# Patient Record
Sex: MALE | Race: WHITE | NOT HISPANIC OR LATINO | Employment: FULL TIME | URBAN - NONMETROPOLITAN AREA
[De-identification: names, ages, dates, MRNs, and addresses within clinical notes are randomized per-mention and may not be internally consistent; named-entity substitution may affect disease eponyms.]

---

## 2020-02-14 ENCOUNTER — OFFICE VISIT (OUTPATIENT)
Dept: CARDIOLOGY CLINIC | Facility: CLINIC | Age: 55
End: 2020-02-14
Payer: COMMERCIAL

## 2020-02-14 VITALS
SYSTOLIC BLOOD PRESSURE: 132 MMHG | DIASTOLIC BLOOD PRESSURE: 82 MMHG | WEIGHT: 216.05 LBS | BODY MASS INDEX: 28.63 KG/M2 | HEIGHT: 73 IN | HEART RATE: 74 BPM

## 2020-02-14 DIAGNOSIS — I10 ESSENTIAL HYPERTENSION: ICD-10-CM

## 2020-02-14 DIAGNOSIS — R06.00 DYSPNEA ON EXERTION: ICD-10-CM

## 2020-02-14 DIAGNOSIS — R55 NEAR SYNCOPE: Primary | ICD-10-CM

## 2020-02-14 DIAGNOSIS — E78.2 MIXED HYPERLIPIDEMIA: ICD-10-CM

## 2020-02-14 PROBLEM — E78.5 HYPERLIPEMIA: Status: ACTIVE | Noted: 2020-02-14

## 2020-02-14 PROBLEM — R06.09 DYSPNEA ON EXERTION: Status: ACTIVE | Noted: 2020-02-14

## 2020-02-14 PROCEDURE — 99204 OFFICE O/P NEW MOD 45 MIN: CPT | Performed by: INTERNAL MEDICINE

## 2020-02-14 RX ORDER — SILDENAFIL 100 MG/1
100 TABLET, FILM COATED ORAL
COMMUNITY
Start: 2016-01-04

## 2020-02-14 RX ORDER — NEBIVOLOL 2.5 MG/1
2.5 TABLET ORAL DAILY
COMMUNITY
Start: 2016-01-26

## 2020-02-14 RX ORDER — ATOVAQUONE AND PROGUANIL HYDROCHLORIDE 250; 100 MG/1; MG/1
1 TABLET, FILM COATED ORAL
COMMUNITY
Start: 2018-06-28 | End: 2020-02-14 | Stop reason: CLARIF

## 2020-02-14 RX ORDER — ASPIRIN 81 MG/1
81 TABLET ORAL DAILY
COMMUNITY

## 2020-02-14 RX ORDER — SIMVASTATIN 40 MG
40 TABLET ORAL
COMMUNITY
Start: 2016-02-19

## 2020-02-14 NOTE — PATIENT INSTRUCTIONS
EKG today shows normal sinus rhythm appears normal   I would recommend echocardiogram to assess heart structure and function  I would recommend exercise stress echocardiogram to assess heart rate and blood pressure response to exercise as well as to rule out any significant rhythm issues that could be contributing to near syncope

## 2020-02-14 NOTE — PROGRESS NOTES
Cardiology Office Visit    Navya Milian  [id number]  1965     [facility] [place]  [address] [id number] [address]      Dear Adolfo Wesley DO,    I had the pleasure of seeing your patient at our [address] [place] [address] office today 2/14/2020  As you know he is a pleasant 53y o  year old male with a medical history as described below  Reason for office visit: Evaluation of shortness of breath and near syncope  1  Near syncope  Assessment & Plan:  Patient reports 2 episodes of near syncope in the setting of dyspnea on exertion  I have recommended echocardiogram and stress echocardiogram to assess heart structure and function as well as to assess heart rate and blood pressure response to exercise  Orders:  -     Echo complete with contrast if indicated; Future; Expected date: 02/14/2020  -     Echo stress test w contrast if indicated; Future; Expected date: 02/14/2020  -     POCT ECG    2  Dyspnea on exertion  Assessment & Plan: Onset of dyspnea on exertion over the last month  Two episodes of near syncope that have occurred with exertion  Risk factors for underlying coronary disease include hypertension and hyperlipidemia  Unclear if dyspnea could be an anginal equivalent  Exercise stress echocardiogram and echocardiogram as above  Orders:  -     Echo complete with contrast if indicated; Future; Expected date: 02/14/2020  -     Echo stress test w contrast if indicated; Future; Expected date: 02/14/2020    3  Essential hypertension  Assessment & Plan:  Continue current blood pressure medication without change  Blood pressure controlled on my personal recheck  Orders:  -     Echo complete with contrast if indicated; Future; Expected date: 02/14/2020  -     Echo stress test w contrast if indicated; Future; Expected date: 02/14/2020  -     POCT ECG    4   Mixed hyperlipidemia  Assessment & Plan:  Patient is on simvastatin 40 mg daily  Will try to obtain most recent lab work including lipid panel  Orders:  -     Echo complete with contrast if indicated; Future; Expected date: 02/14/2020  -     Echo stress test w contrast if indicated; Future; Expected date: 02/14/2020           HPI   History of Present Illness:     Patient presents for evaluation of shortness of breath/dyspnea on exertion and near syncope  Patient has a history of hypertension and hyperlipidemia which are being treated  Patient reports that he has always been very active and walks often and takes weights along with him when walking  He tells me that over the last month he seems to have developed worsening dyspnea on exertion especially when climbing the hill back to his house  He reports 2 episodes of near syncope in the setting of feeling winded and a sense of "something bad was about to happen'  He does tell me that he had been trying to stop his Bystolic and was taking it every other day during these events  He denies any overt chest pain  He does say that he felt very anxious after the two near syncopal episodes  He denies any palpitations  He does report increased fatigue  He does consume a fair amount of alcohol a few nights a week       Patient Active Problem List   Diagnosis    Hypertension    Hyperlipemia    Near syncope    Dyspnea on exertion     Past Medical History:   Diagnosis Date    Hyperlipemia     Hypertension     Melanoma ([facility] [address] )      Social History     Socioeconomic History    Marital status: /Civil Union     Spouse name: Not on file    Number of children: 2    Years of education: Not on file    Highest education level: Not on file   Occupational History    Occupation: Financial consulting   Social Needs    Financial resource strain: Not on file    Food insecurity:     Worry: Not on file     Inability: Not on file    Transportation needs:     Medical: Not on file     Non-medical: Not on file   Tobacco Use    Smoking status: Never Smoker    Smokeless tobacco: Never Used   Substance and Sexual Activity    Alcohol use: Yes     Frequency: 2-3 times a week     Drinks per session: 7 to 9    Drug use: Never    Sexual activity: Not on file     Comment: Defer   Lifestyle    Physical activity:     Days per week: Not on file     Minutes per session: Not on file    Stress: Not on file   Relationships    Social connections:     Talks on phone: Not on file     Gets together: Not on file     Attends Mosque service: Not on file     Active member of club or organization: Not on file     Attends meetings of clubs or organizations: Not on file     Relationship status: Not on file    Intimate partner violence:     Fear of current or ex partner: Not on file     Emotionally abused: Not on file     Physically abused: Not on file     Forced sexual activity: Not on file   Other Topics Concern    Not on file   Social History Narrative    Not on file      Family History   Problem Relation Age of Onset    Pancreatic cancer Mother     Melanoma Father     No Known Problems Sister      Past Surgical History:   Procedure Laterality Date    ELBOW SURGERY Right     KNEE SURGERY Right     SKIN TAG REMOVAL      VASECTOMY         Current Outpatient Medications:     aspirin (ECOTRIN LOW STRENGTH) 81 mg EC tablet, Take 81 mg by mouth daily, Disp: , Rfl:     nebivolol (Bystolic) 2 5 mg tablet, Take 2 5 mg by mouth daily, Disp: , Rfl:     sildenafil (Viagra) 100 mg tablet, Take 100 mg by mouth, Disp: , Rfl:     simvastatin (ZOCOR) 40 mg tablet, Take 40 mg by mouth, Disp: , Rfl:   No Known Allergies        EC2020: Normal sinus rhythm  Normal ECG  Review of Systems:    Review of Systems   Constitutional: Positive for fatigue  Negative for activity change and appetite change  HENT: Negative for congestion, hearing loss, tinnitus and trouble swallowing  Eyes: Negative for visual disturbance     Respiratory: Positive for shortness of breath (Dyspnea)  Negative for cough, chest tightness and wheezing  Cardiovascular: Negative for chest pain, palpitations and leg swelling  Gastrointestinal: Negative for abdominal distention, abdominal pain, nausea and vomiting  Genitourinary: Negative for difficulty urinating  Musculoskeletal: Negative for arthralgias  Skin: Negative for rash  Neurological: Positive for light-headedness  Negative for dizziness and syncope  Hematological: Does not bruise/bleed easily  Psychiatric/Behavioral: Negative for confusion  The patient is not nervous/anxious  All other systems reviewed and are negative  Vitals:    02/14/20 1032 02/14/20 1109   BP: 142/88 132/82   BP Location: Left arm    Patient Position: Sitting    Cuff Size: Standard    Pulse: 74    Weight: 98 kg (216 lb 0 8 oz)    Height: 6' 1" (1 854 m)      Vitals:    02/14/20 1032   Weight: 98 kg (216 lb 0 8 oz)     Height: 6' 1" (185 4 cm)     Physical Exam   Constitutional: He is oriented to person, place, and time  He appears well-developed and well-nourished  HENT:   Head: Normocephalic and atraumatic  Eyes: Pupils are equal, round, and reactive to light  Conjunctivae are normal    Neck: Normal range of motion  No JVD present  Cardiovascular: Normal rate, regular rhythm and normal heart sounds  Exam reveals no gallop and no friction rub  No murmur heard  Pulmonary/Chest: Effort normal and breath sounds normal    Abdominal: Soft  Bowel sounds are normal    Musculoskeletal: He exhibits no edema  Neurological: He is alert and oriented to person, place, and time  Skin: Skin is warm and dry  Psychiatric: He has a normal mood and affect  His behavior is normal    Vitals reviewed

## 2020-02-15 PROCEDURE — 93000 ELECTROCARDIOGRAM COMPLETE: CPT | Performed by: INTERNAL MEDICINE

## 2020-02-15 NOTE — ASSESSMENT & PLAN NOTE
Onset of dyspnea on exertion over the last month  Two episodes of near syncope that have occurred with exertion  Risk factors for underlying coronary disease include hypertension and hyperlipidemia  Unclear if dyspnea could be an anginal equivalent  Exercise stress echocardiogram and echocardiogram as above  no

## 2020-02-15 NOTE — ASSESSMENT & PLAN NOTE
Continue current blood pressure medication without change  Blood pressure controlled on my personal recheck

## 2020-02-15 NOTE — ASSESSMENT & PLAN NOTE
Patient is on simvastatin 40 mg daily  Will try to obtain most recent lab work including lipid panel

## 2020-02-15 NOTE — ASSESSMENT & PLAN NOTE
Patient reports 2 episodes of near syncope in the setting of dyspnea on exertion  I have recommended echocardiogram and stress echocardiogram to assess heart structure and function as well as to assess heart rate and blood pressure response to exercise

## 2020-02-28 DIAGNOSIS — R06.00 DYSPNEA ON EXERTION: ICD-10-CM

## 2020-02-28 DIAGNOSIS — R55 NEAR SYNCOPE: Primary | ICD-10-CM

## 2020-02-28 DIAGNOSIS — I10 ESSENTIAL HYPERTENSION: ICD-10-CM

## 2020-02-28 DIAGNOSIS — E78.2 MIXED HYPERLIPIDEMIA: ICD-10-CM

## 2020-03-03 ENCOUNTER — HOSPITAL ENCOUNTER (OUTPATIENT)
Dept: NON INVASIVE DIAGNOSTICS | Facility: CLINIC | Age: 55
Discharge: HOME | End: 2020-03-03
Payer: COMMERCIAL

## 2020-03-03 DIAGNOSIS — R55 NEAR SYNCOPE: ICD-10-CM

## 2020-03-03 DIAGNOSIS — I10 ESSENTIAL HYPERTENSION: ICD-10-CM

## 2020-03-03 DIAGNOSIS — R06.00 DYSPNEA ON EXERTION: ICD-10-CM

## 2020-03-03 DIAGNOSIS — E78.2 MIXED HYPERLIPIDEMIA: ICD-10-CM

## 2020-03-03 PROCEDURE — 93016 CV STRESS TEST SUPVJ ONLY: CPT | Performed by: INTERNAL MEDICINE

## 2020-03-03 PROCEDURE — 93017 CV STRESS TEST TRACING ONLY: CPT

## 2020-03-03 PROCEDURE — 93018 CV STRESS TEST I&R ONLY: CPT | Performed by: INTERNAL MEDICINE

## 2020-03-04 LAB
ARRHY DURING EX: NORMAL
CHEST PAIN STATEMENT: NORMAL
MAX DIASTOLIC BP: 82 MMHG
MAX HEART RATE: 148 BPM
MAX PREDICTED HEART RATE: 165 BPM
MAX. SYSTOLIC BP: 166 MMHG
PROTOCOL NAME: NORMAL
REASON FOR TERMINATION: NORMAL
TARGET HR FORMULA: NORMAL
TEST INDICATION: NORMAL
TIME IN EXERCISE PHASE: NORMAL

## 2020-03-06 ENCOUNTER — HOSPITAL ENCOUNTER (OUTPATIENT)
Dept: NON INVASIVE DIAGNOSTICS | Facility: HOSPITAL | Age: 55
Discharge: HOME | End: 2020-03-06
Attending: INTERNAL MEDICINE
Payer: COMMERCIAL

## 2020-03-06 DIAGNOSIS — E78.2 MIXED HYPERLIPIDEMIA: ICD-10-CM

## 2020-03-06 DIAGNOSIS — R06.00 DYSPNEA ON EXERTION: ICD-10-CM

## 2020-03-06 DIAGNOSIS — R55 NEAR SYNCOPE: ICD-10-CM

## 2020-03-06 DIAGNOSIS — I10 ESSENTIAL HYPERTENSION: ICD-10-CM

## 2020-03-06 PROCEDURE — 93306 TTE W/DOPPLER COMPLETE: CPT | Performed by: INTERNAL MEDICINE

## 2020-03-06 PROCEDURE — 93306 TTE W/DOPPLER COMPLETE: CPT

## 2020-03-12 ENCOUNTER — OFFICE VISIT (OUTPATIENT)
Dept: CARDIOLOGY CLINIC | Facility: CLINIC | Age: 55
End: 2020-03-12
Payer: COMMERCIAL

## 2020-03-12 VITALS
HEART RATE: 87 BPM | OXYGEN SATURATION: 98 % | SYSTOLIC BLOOD PRESSURE: 110 MMHG | BODY MASS INDEX: 28.93 KG/M2 | HEIGHT: 73 IN | DIASTOLIC BLOOD PRESSURE: 78 MMHG | WEIGHT: 218.26 LBS

## 2020-03-12 DIAGNOSIS — R06.00 DYSPNEA ON EXERTION: ICD-10-CM

## 2020-03-12 DIAGNOSIS — R55 NEAR SYNCOPE: Primary | ICD-10-CM

## 2020-03-12 DIAGNOSIS — I10 ESSENTIAL HYPERTENSION: ICD-10-CM

## 2020-03-12 DIAGNOSIS — E78.2 MIXED HYPERLIPIDEMIA: ICD-10-CM

## 2020-03-12 PROCEDURE — 99213 OFFICE O/P EST LOW 20 MIN: CPT | Performed by: INTERNAL MEDICINE

## 2020-03-12 NOTE — ASSESSMENT & PLAN NOTE
Patient previously reported 2 episodes of near-syncope in the setting of dyspnea on exertion  I had recommended echocardiogram as well as exercise stress test   Echocardiogram was within normal limits as was exercise stress test   I have recommended that he continue to monitor his symptoms  If recurrent episodes we could certainly consider ZIO monitoring

## 2020-03-12 NOTE — ASSESSMENT & PLAN NOTE
Patient with onset of dyspnea on exertion  Two episodes of near syncope that occurred with exertion  Echocardiogram and stress test normal    I have asked the patient to continue to monitor for recurrent symptoms  Unclear if this could be an anginal equivalent  I have recommended coronary calcium scoring for further risk stratification in combination with testing as outlined above

## 2020-03-12 NOTE — PROGRESS NOTES
Cardiology Office Visit    Beryle Dublin  [id number]  1965     [facility] [place]  [id number] [facility] [address] [place] [address]      Dear Bacilio Daniels DO,    I had the pleasure of seeing your patient at our [address] [place] [address] office today 3/12/2020  As you know he is a pleasant 53y o  year old male with a medical history as described below  Reason for office visit:  Follow-up near-syncope, dyspnea on exertion, hypertension and hyperlipidemia  1  Near syncope  Assessment & Plan:  Patient previously reported 2 episodes of near-syncope in the setting of dyspnea on exertion  I had recommended echocardiogram as well as exercise stress test   Echocardiogram was within normal limits as was exercise stress test   I have recommended that he continue to monitor his symptoms  If recurrent episodes we could certainly consider ZIO monitoring  Orders:  -     CT coronary calcium score; Future; Expected date: 03/12/2020    2  Dyspnea on exertion  Assessment & Plan:  Patient with onset of dyspnea on exertion  Two episodes of near syncope that occurred with exertion  Echocardiogram and stress test normal    I have asked the patient to continue to monitor for recurrent symptoms  Unclear if this could be an anginal equivalent  I have recommended coronary calcium scoring for further risk stratification in combination with testing as outlined above  Orders:  -     CT coronary calcium score; Future; Expected date: 03/12/2020    3  Essential hypertension  Assessment & Plan:  Blood pressure is well controlled on Bystolic 2 5 mg     Orders:  -     CT coronary calcium score; Future; Expected date: 03/12/2020    4  Mixed hyperlipidemia  Assessment & Plan:  Patient is on simvastatin 40 mg daily  Will try to obtain most recent lab work including lipid panel  Orders:  -     CT coronary calcium score;  Future; Expected date: 03/12/2020           HPI     Patient presents for follow-up of near-syncope, dyspnea on exertion, hypertension and hyperlipidemia  I had initially met the patient 02/14/2020 when he presented for evaluation  About a month prior to his office visit he had noted that he was developing some dyspnea on exertion especially when climbing the held back up to his house  He also reported 2 episodes of near-syncope in the setting of feeling winded as well as a sense of something bad was about to happen  He did tell me at that time he had been trying to stop his Bystolic and was taking it every other day at the time these events occurred  He denied any overt chest pain  He does admit that he felt very anxious after the 2 episodes of near-syncope  I had recommended echocardiogram to assess heart structure and function as well as an exercise stress test to evaluate heart rate and blood pressure response to exercise and to rule out underlying ischemia  Echocardiogram 03/06/2020 was completely normal with an ejection fraction of 60%  Normal diastolic function and no significant valve abnormalities  Exercise stress test 03/03/2020 was normal   The patient exercised 10 minutes  No chest pain was noted  Stress EKG was negative for ischemia  Patient returns to the office today to discuss results of testing  We reviewed the results of his tests which were all within normal limits  He denies any recurrent symptoms  He denies any recurrent near-syncope  I also discussed with him that I care more about his symptoms than I do the results of the testing itself  We also discussed proceeding with coronary artery calcium scoring which he is interested in doing        Patient Active Problem List   Diagnosis    Hypertension    Hyperlipemia    Near syncope    Dyspnea on exertion     Past Medical History:   Diagnosis Date    Hyperlipemia     Hypertension     Melanoma ([address] )      Social History     Socioeconomic History    Marital status: /Civil Union Spouse name: Not on file    Number of children: 2    Years of education: Not on file    Highest education level: Not on file   Occupational History    Occupation: Financial consulting   Social Needs    Financial resource strain: Not on file    Food insecurity:     Worry: Not on file     Inability: Not on file    Transportation needs:     Medical: Not on file     Non-medical: Not on file   Tobacco Use    Smoking status: Never Smoker    Smokeless tobacco: Never Used   Substance and Sexual Activity    Alcohol use: Yes     Frequency: 2-3 times a week     Drinks per session: 7 to 9    Drug use: Never    Sexual activity: Not on file     Comment: Defer   Lifestyle    Physical activity:     Days per week: Not on file     Minutes per session: Not on file    Stress: Not on file   Relationships    Social connections:     Talks on phone: Not on file     Gets together: Not on file     Attends Yazidi service: Not on file     Active member of club or organization: Not on file     Attends meetings of clubs or organizations: Not on file     Relationship status: Not on file    Intimate partner violence:     Fear of current or ex partner: Not on file     Emotionally abused: Not on file     Physically abused: Not on file     Forced sexual activity: Not on file   Other Topics Concern    Not on file   Social History Narrative    Not on file      Family History   Problem Relation Age of Onset    Pancreatic cancer Mother     Melanoma Father     No Known Problems Sister      Past Surgical History:   Procedure Laterality Date    ELBOW SURGERY Right     KNEE SURGERY Right     SKIN TAG REMOVAL      VASECTOMY  2003       Current Outpatient Medications:     aspirin (ECOTRIN LOW STRENGTH) 81 mg EC tablet, Take 81 mg by mouth daily, Disp: , Rfl:     nebivolol (Bystolic) 2 5 mg tablet, Take 2 5 mg by mouth daily, Disp: , Rfl:     sildenafil (Viagra) 100 mg tablet, Take 100 mg by mouth, Disp: , Rfl:    simvastatin (ZOCOR) 40 mg tablet, Take 40 mg by mouth, Disp: , Rfl:   No Known Allergies      EC2020:  Normal sinus rhythm  Normal EKG  Review of Systems:    Review of Systems   Constitutional: Negative for activity change, appetite change and fatigue  HENT: Negative for congestion, hearing loss, tinnitus and trouble swallowing  Eyes: Negative for visual disturbance  Respiratory: Negative for cough, chest tightness, shortness of breath and wheezing  Cardiovascular: Negative for chest pain, palpitations and leg swelling  Gastrointestinal: Negative for abdominal distention, abdominal pain, nausea and vomiting  Genitourinary: Negative for difficulty urinating  Musculoskeletal: Negative for arthralgias  Skin: Negative for rash  Neurological: Negative for dizziness, syncope and light-headedness  Hematological: Does not bruise/bleed easily  Psychiatric/Behavioral: Negative for confusion  The patient is not nervous/anxious  All other systems reviewed and are negative  Vitals:    20 0930   BP: 110/78   BP Location: Left arm   Patient Position: Sitting   Pulse: 87   SpO2: 98%   Weight: 99 kg (218 lb 4 1 oz)   Height: 6' 1" (1 854 m)     Vitals:    20 0930   Weight: 99 kg (218 lb 4 1 oz)     Height: 6' 1" (185 4 cm)     Physical Exam   Constitutional: He is oriented to person, place, and time  He appears well-developed and well-nourished  HENT:   Head: Normocephalic and atraumatic  Eyes: Pupils are equal, round, and reactive to light  Conjunctivae are normal    Neck: Normal range of motion  No JVD present  Cardiovascular: Normal rate, regular rhythm and normal heart sounds  Exam reveals no gallop and no friction rub  No murmur heard  Pulmonary/Chest: Effort normal and breath sounds normal    Abdominal: Soft  Bowel sounds are normal    Musculoskeletal: He exhibits no edema  Neurological: He is alert and oriented to person, place, and time     Skin: Skin is warm and dry  Psychiatric: He has a normal mood and affect  His behavior is normal    Vitals reviewed

## 2020-03-12 NOTE — PATIENT INSTRUCTIONS
Exercise stress test showed no evidence of any significant blockages in the heart  Echocardiogram showed normal heart size and function  I would recommend coronary artery calcium scoring as discussed for further risk stratification    Continue monitoring symptoms as these are more important than the actual results of the test

## 2022-08-09 ENCOUNTER — TELEPHONE (OUTPATIENT)
Dept: UROLOGY | Facility: CLINIC | Age: 57
End: 2022-08-09

## 2022-08-09 NOTE — TELEPHONE ENCOUNTER
Email received from Dr Bill House to schedule patient for a visit with him  Called and left message for return call to schedule appt     When patient calls back please offer 9/8/22 at 8am, I did tentatively schedule this

## 2022-09-07 PROBLEM — Z12.5 PROSTATE CANCER SCREENING ENCOUNTER, OPTIONS AND RISKS DISCUSSED: Status: ACTIVE | Noted: 2022-09-07

## 2022-09-07 PROBLEM — N40.1 BENIGN LOCALIZED PROSTATIC HYPERPLASIA WITH LOWER URINARY TRACT SYMPTOMS (LUTS): Status: ACTIVE | Noted: 2022-09-07

## 2022-09-07 NOTE — PROGRESS NOTES
Problem List Items Addressed This Visit        Cardiovascular and Mediastinum    Erectile dysfunction due to arterial insufficiency       Genitourinary    Benign localized prostatic hyperplasia with lower urinary tract symptoms (LUTS)    Relevant Orders    Urinalysis with microscopic       Other    Prostate cancer screening encounter, options and risks discussed - Primary    Relevant Orders    PSA Total, Diagnostic    Low libido    Relevant Orders    Testosterone, free, total            Discussion:    Corinne Bills and I had a productive initial consultation today  He does have some enlargement of his prostate on digital rectal examination at 35 grams, he is otherwise voiding well and we can survey his lower urinary tract symptoms at this time  He does have gout, does not typically get many attacks, he is taking some medications for blood pressure and cholesterol as well  He does use sildenafil for erectile dysfunction which is working for him most of the time  He is , he endorses no new relationship stressors and is feeling safe at home  He works as a   He is otherwise physically active and has a normal body mass index  His main complaint today is decreased sexual function and low libido and lack of energy consistent with symptoms of low testosterone  His measure testosterone is 336 which is lower than I would expect for a man his age and of his lifestyle given that he keeps active and does not have typical modifiable risk factors at the current time  PSA is 0 43  I have ordered a repeat testosterone testing, free and total   Once I have these lab results back I will order him for testosterone replacement therapy  He does understand that he will require periodic measuring of his hematocrit as well as PSA and testosterone levels after we initiate this therapy  He may or may not be interested in injection therapy if he does not tolerate the gel therapy going forward      He will see me back in 3 months  I will order his testosterone replacement therapy once I have his further lab work  From a prostate cancer screening perspective he is low risk at this time    Assessment and plan:       Please see problem oriented charting for the assessment plan of today's urological complaints      Digna Daniel MD      Chief Complaint   As above      History of Present Illness     Rebecca Cota is a 61 y o  man, new patient, presents with complaints of low energy and low libido  He works as an , otherwise keep healthy and is physically active  He has no family history of prostate cancer or kidney cancer  Does have a family history of pancreatic cancer, however  No aggravating alleviating factors  He is , no troubles with his relationship, feels safe at home  No smoking, no heavy alcohol intake  No other modifiable risk factors with regard to his current complaints  He has symptoms of hypogonadism and low testosterone  The following portions of the patient's history were reviewed and updated as appropriate: allergies, current medications, past family history, past medical history, past social history, past surgical history and problem list     Detailed Urologic History     - please refer to HPI    Review of Systems     Review of Systems   Constitutional: Positive for fatigue  HENT: Negative  Eyes: Negative  Respiratory: Negative  Cardiovascular: Negative  Gastrointestinal: Negative  Endocrine: Negative  Genitourinary: Negative  As per HPI   Musculoskeletal: Negative  Skin: Negative  Allergic/Immunologic: Negative  Neurological: Negative  Hematological: Negative  Psychiatric/Behavioral: Negative  Allergies     No Known Allergies    Physical Exam     Physical Exam  Vitals reviewed  Constitutional:       General: He is not in acute distress  Appearance: Normal appearance   He is not ill-appearing, toxic-appearing or diaphoretic  HENT:      Head: Normocephalic and atraumatic  Eyes:      General: No scleral icterus  Right eye: No discharge  Left eye: No discharge  Cardiovascular:      Pulses: Normal pulses  Pulmonary:      Effort: Pulmonary effort is normal    Abdominal:      General: There is no distension  Palpations: There is no mass  Genitourinary:     Comments: Normal Ronald stage, no inguinal hernias  Normal meatus, normal testes bilaterally, normal perineum  Normal sphincter tone, prostate is 30-35 grams and smooth without nodules or consistency changes  Musculoskeletal:         General: No swelling  Skin:     General: Skin is warm  Neurological:      General: No focal deficit present  Mental Status: He is alert and oriented to person, place, and time  Cranial Nerves: No cranial nerve deficit  Psychiatric:         Mood and Affect: Mood normal          Behavior: Behavior normal          Thought Content:  Thought content normal          Judgment: Judgment normal              Vital Signs  Vitals:    09/08/22 0751   BP: 132/82   Pulse: 74   Weight: 96 6 kg (213 lb)         Current Medications       Current Outpatient Medications:     allopurinol (ZYLOPRIM) 100 mg tablet, Take 100 mg by mouth daily, Disp: , Rfl:     aspirin (ECOTRIN LOW STRENGTH) 81 mg EC tablet, Take 81 mg by mouth daily, Disp: , Rfl:     nebivolol (BYSTOLIC) 2 5 mg tablet, Take 2 5 mg by mouth daily, Disp: , Rfl:     sildenafil (VIAGRA) 100 mg tablet, Take 100 mg by mouth, Disp: , Rfl:     simvastatin (ZOCOR) 40 mg tablet, Take 40 mg by mouth, Disp: , Rfl:       Active Problems     Patient Active Problem List   Diagnosis    Hypertension    Hyperlipemia    Near syncope    Dyspnea on exertion    Prostate cancer screening encounter, options and risks discussed    Benign localized prostatic hyperplasia with lower urinary tract symptoms (LUTS)    Low libido    Erectile dysfunction due to arterial insufficiency         Past Medical History     Past Medical History:   Diagnosis Date    Hyperlipemia     Hypertension     Melanoma ([address] )          Surgical History     Past Surgical History:   Procedure Laterality Date    ELBOW SURGERY Right     KNEE SURGERY Right     SKIN TAG REMOVAL      VASECTOMY  2003         Family History     Family History   Problem Relation Age of Onset    Pancreatic cancer Mother     Melanoma Father     No Known Problems Sister          Social History     Social History     Social History     Tobacco Use   Smoking Status Never Smoker   Smokeless Tobacco Never Used         Pertinent Lab Values     No results found for: CREATININE    External labs reviewed, testosterone is low at 336, PSA is 0 4          Pertinent Imaging      No imaging for my review

## 2022-09-08 ENCOUNTER — CONSULT (OUTPATIENT)
Dept: UROLOGY | Facility: CLINIC | Age: 57
End: 2022-09-08
Payer: COMMERCIAL

## 2022-09-08 VITALS
WEIGHT: 213 LBS | SYSTOLIC BLOOD PRESSURE: 132 MMHG | BODY MASS INDEX: 28.1 KG/M2 | HEART RATE: 74 BPM | DIASTOLIC BLOOD PRESSURE: 82 MMHG

## 2022-09-08 DIAGNOSIS — N52.01 ERECTILE DYSFUNCTION DUE TO ARTERIAL INSUFFICIENCY: ICD-10-CM

## 2022-09-08 DIAGNOSIS — R68.82 LOW LIBIDO: ICD-10-CM

## 2022-09-08 DIAGNOSIS — Z12.5 PROSTATE CANCER SCREENING ENCOUNTER, OPTIONS AND RISKS DISCUSSED: Primary | ICD-10-CM

## 2022-09-08 DIAGNOSIS — N40.1 BENIGN LOCALIZED PROSTATIC HYPERPLASIA WITH LOWER URINARY TRACT SYMPTOMS (LUTS): ICD-10-CM

## 2022-09-08 PROCEDURE — 99204 OFFICE O/P NEW MOD 45 MIN: CPT | Performed by: UROLOGY

## 2022-09-08 RX ORDER — ALLOPURINOL 100 MG/1
100 TABLET ORAL DAILY
COMMUNITY
Start: 2022-08-18

## 2022-09-22 ENCOUNTER — APPOINTMENT (OUTPATIENT)
Dept: LAB | Facility: CLINIC | Age: 57
End: 2022-09-22
Payer: COMMERCIAL

## 2022-09-22 DIAGNOSIS — Z12.5 PROSTATE CANCER SCREENING ENCOUNTER, OPTIONS AND RISKS DISCUSSED: ICD-10-CM

## 2022-09-22 DIAGNOSIS — R68.82 LOW LIBIDO: ICD-10-CM

## 2022-09-22 LAB
BACTERIA UR QL AUTO: NORMAL /HPF
BILIRUB UR QL STRIP: NEGATIVE
CLARITY UR: CLEAR
COLOR UR: NORMAL
GLUCOSE UR STRIP-MCNC: NEGATIVE MG/DL
HGB UR QL STRIP.AUTO: NEGATIVE
KETONES UR STRIP-MCNC: NEGATIVE MG/DL
LEUKOCYTE ESTERASE UR QL STRIP: NEGATIVE
NITRITE UR QL STRIP: NEGATIVE
NON-SQ EPI CELLS URNS QL MICRO: NORMAL /HPF
PH UR STRIP.AUTO: 6.5 [PH]
PROT UR STRIP-MCNC: NEGATIVE MG/DL
PSA SERPL-MCNC: 0.5 NG/ML (ref 0–4)
RBC #/AREA URNS AUTO: NORMAL /HPF
SP GR UR STRIP.AUTO: 1.01 (ref 1–1.03)
UROBILINOGEN UR STRIP-ACNC: <2 MG/DL
WBC #/AREA URNS AUTO: NORMAL /HPF

## 2022-09-22 PROCEDURE — 36415 COLL VENOUS BLD VENIPUNCTURE: CPT

## 2022-09-22 PROCEDURE — 84403 ASSAY OF TOTAL TESTOSTERONE: CPT

## 2022-09-22 PROCEDURE — 81001 URINALYSIS AUTO W/SCOPE: CPT | Performed by: UROLOGY

## 2022-09-22 PROCEDURE — 84402 ASSAY OF FREE TESTOSTERONE: CPT

## 2022-09-22 PROCEDURE — 84153 ASSAY OF PSA TOTAL: CPT

## 2022-09-23 LAB
TESTOST FREE SERPL-MCNC: 10.4 PG/ML (ref 7.2–24)
TESTOST SERPL-MCNC: 361 NG/DL (ref 264–916)

## 2022-09-26 ENCOUNTER — TELEPHONE (OUTPATIENT)
Dept: UROLOGY | Facility: CLINIC | Age: 57
End: 2022-09-26

## 2022-09-26 DIAGNOSIS — E29.1 HYPOGONADISM IN MALE: Primary | ICD-10-CM

## 2022-09-26 RX ORDER — TESTOSTERONE GEL, 1% 10 MG/G
50 GEL TRANSDERMAL DAILY
Qty: 90 PACKET | Refills: 0 | Status: SHIPPED | OUTPATIENT
Start: 2022-09-26 | End: 2022-12-25

## 2022-09-26 NOTE — TELEPHONE ENCOUNTER
Spoke with patient and informed him Dr Kenneth Moran sent Androgel to pharmacy  New labs were ordered for patient to have done prior to his next office visit in December  Patient requested the labs be emailed to him at the email address on file  Emailed

## 2022-09-26 NOTE — TELEPHONE ENCOUNTER
Please call fifi to let him know that I have sent androgel to his pharmacy   Please have him see me only in three months with labs prior (ordered)

## 2022-09-28 ENCOUNTER — TELEPHONE (OUTPATIENT)
Dept: OTHER | Facility: OTHER | Age: 57
End: 2022-09-28

## 2022-09-30 NOTE — TELEPHONE ENCOUNTER
Pharmacy benefits verified: BIN#:  [id number] - GRP#:  [id number] - ID#:  [id number]  Prior Authorization for Testosterone (1%) 50mg/5g gel packets was requested and initiated via CoverMyMeds  com - Shreya CANNON Case ID: [id number]  Response questions answered and submitted for consideration  Final determination was received immediately:      Prior Authorization for Testosterone (1%) 50mg/5g gel packets was APPROVED ( Case ID#:  [id number]) and valid from 9/30/22 until 9/30/23  Both the pharmacy and the patient made aware of same  No further action required

## 2022-10-06 NOTE — TELEPHONE ENCOUNTER
Patient states that [facility] still has not received information regarding pre-auth for testosterone  Please call pharmacy

## 2022-11-06 PROBLEM — Z12.5 PROSTATE CANCER SCREENING ENCOUNTER, OPTIONS AND RISKS DISCUSSED: Status: RESOLVED | Noted: 2022-09-07 | Resolved: 2022-11-06

## 2022-12-07 NOTE — PROGRESS NOTES
Problem List Items Addressed This Visit        Cardiovascular and Mediastinum    Erectile dysfunction due to arterial insufficiency     Responding well to as needed Viagra  Libido has improved on testosterone            Genitourinary    Benign localized prostatic hyperplasia with lower urinary tract symptoms (LUTS)     Voiding well at this time, we will track his voiding going forward via AUA symptom score and assess need for more active intervention or treatment in the future as necessary            Other    Low libido     Improving on testosterone replacement therapy         Long-term current use of testosterone replacement therapy     No adverse effects from testosterone replacement therapy at this time  I have renewed these prescriptions  I will see him back in 6 months with labs prior, PSA, CBC, and testosterone levels         Low testosterone in male - Primary     Tolerating testosterone replacement therapy well  Check his labs going forward in 6 months  He can then follow-up every 6 to 12 months if labs are stable         Relevant Orders    CBC and differential    PSA Total, Diagnostic    Testosterone, free, total   Other Visit Diagnoses     Hypogonadism in male        Relevant Medications    testosterone (ANDROGEL) 1%              Assessment and plan:       Please see problem oriented charting for the assessment plan of today's urological complaints      Fernando Glynn MD      Chief Complaint     As above      History of Present Illness     Karly Harman is a 61 y o  man with chief complaints as above, please see problem-oriented charting above for details of the history of present illness today      The following portions of the patient's history were reviewed and updated as appropriate: allergies, current medications, past family history, past medical history, past social history, past surgical history and problem list     Detailed Urologic History     - please refer to HPI    Review of Systems Review of Systems   Constitutional: Negative  HENT: Negative  Eyes: Negative  Respiratory: Negative  Cardiovascular: Negative  Gastrointestinal: Negative  Endocrine: Negative  Genitourinary: Negative  As per HPI   Musculoskeletal: Negative  Skin: Negative  Allergic/Immunologic: Negative  Neurological: Negative  Hematological: Negative  Psychiatric/Behavioral: Negative  Allergies     No Known Allergies    Physical Exam     Physical Exam  Vitals reviewed  Constitutional:       General: He is not in acute distress  Appearance: Normal appearance  He is not ill-appearing, toxic-appearing or diaphoretic  HENT:      Head: Normocephalic and atraumatic  Eyes:      General: No scleral icterus  Right eye: No discharge  Left eye: No discharge  Cardiovascular:      Pulses: Normal pulses  Pulmonary:      Effort: Pulmonary effort is normal    Abdominal:      General: There is no distension  Palpations: There is no mass  Musculoskeletal:         General: No swelling  Skin:     General: Skin is warm  Neurological:      General: No focal deficit present  Mental Status: He is alert and oriented to person, place, and time  Cranial Nerves: No cranial nerve deficit  Psychiatric:         Mood and Affect: Mood normal          Behavior: Behavior normal          Thought Content:  Thought content normal          Judgment: Judgment normal              Vital Signs  Vitals:    12/09/22 0849   BP: 140/90   BP Location: Left arm   Patient Position: Sitting   Cuff Size: Standard   Pulse: 84   SpO2: 99%   Weight: 95 1 kg (209 lb 11 2 oz)   Height: 6' 1" (1 854 m)         Current Medications       Current Outpatient Medications:   •  allopurinol (ZYLOPRIM) 100 mg tablet, Take 100 mg by mouth daily, Disp: , Rfl:   •  aspirin (ECOTRIN LOW STRENGTH) 81 mg EC tablet, Take 81 mg by mouth daily, Disp: , Rfl:   •  nebivolol (BYSTOLIC) 2 5 mg tablet, Take 2 5 mg by mouth daily, Disp: , Rfl:   •  sildenafil (VIAGRA) 100 mg tablet, Take 100 mg by mouth, Disp: , Rfl:   •  simvastatin (ZOCOR) 40 mg tablet, Take 40 mg by mouth, Disp: , Rfl:   •  testosterone (ANDROGEL) 1%, Apply 1 packet (50 mg total) topically daily, Disp: 90 packet, Rfl: 1      Active Problems     Patient Active Problem List   Diagnosis   • Hypertension   • Hyperlipemia   • Near syncope   • Benign localized prostatic hyperplasia with lower urinary tract symptoms (LUTS)   • Low libido   • Erectile dysfunction due to arterial insufficiency   • Long-term current use of testosterone replacement therapy   • Low testosterone in male         Past Medical History     Past Medical History:   Diagnosis Date   • Hyperlipemia    • Hypertension    • Melanoma ([facility] [address] )          Surgical History     Past Surgical History:   Procedure Laterality Date   • ELBOW SURGERY Right    • KNEE SURGERY Right    • SKIN TAG REMOVAL     • VASECTOMY  2003         Family History     Family History   Problem Relation Age of Onset   • Pancreatic cancer Mother    • Melanoma Father    • No Known Problems Sister          Social History     Social History     Social History     Tobacco Use   Smoking Status Never   Smokeless Tobacco Never         Pertinent Lab Values     No results found for: CREATININE    Lab Results   Component Value Date    PSA 0 5 09/22/2022             Pertinent Imaging      No new imaging for my review

## 2022-12-07 NOTE — PATIENT INSTRUCTIONS
What is low testosterone (male hypogonadism)? Low testosterone (male hypogonadism) is a condition in which the testes (testicles, the male reproductive glands) do not produce enough testosterone (a male sex hormone)  In men, testosterone helps maintain and develop:    Sexual features  Muscle mass  Adequate levels of red blood cells  Bone density  Sense of well-being, and  Sexual and reproductive function  How common is low testosterone? Low testosterone affects almost 40% of men aged 39 and older  It is difficult to define normal testosterone levels, because levels vary throughout the day and are affected by body mass index (BMI), nutrition, alcohol consumption, certain medications, age, and illness  What causes low testosterone? As a man ages, the amount of testosterone in his body gradually drops  This natural decline starts after age 27 and continues (about 1% per year) throughout his life      There are many other potential causes of low testosterone, including the following:    Injury (trauma, interrupted blood supply to the testes) or infection of the testes (orchitis)  Chemotherapy for cancer  Metabolic disorders such as hemochromatosis (too much iron in the body)  Dysfunction or tumors of the pituitary gland  Medications, including opioids, hormones used to treat prostate cancer, and steroids (i e , prednisone)  Acute (short-term) or chronic (long-term) illness  Alcohol abuse  Cirrhosis of the liver  Chronic renal (kidney) failure  HIV/AIDS  Inflammatory conditions such as sarcoidosis (a condition that causes inflammation of the lungs and other organs)  Kallman syndrome (abnormal development of the hypothalamus, a gland in the brain that controls many hormones)  High levels of the milk-producing hormone prolactin  Obesity or extreme weight loss  Uncontrolled type 2 diabetes mellitus  Congenital defect (present at birth)  Obstructive sleep apnea  Aging  Estrogen excess (usually from an external or environmental source)  Anabolic steroid abuse  Severe primary hypothyroidism  Pubertal delay  Trauma (head injury)  Radiation exposure or prior surgery of the brain  What are the symptoms of low testosterone? Symptoms of low testosterone depend on the age of person, and include the following:    Low sex drive  Erectile dysfunction  Decreased sense of well-being  Depressed mood  Difficulties with concentration and memory  Fatigue  Moodiness and irritability  Loss of muscular strength  Other changes that occur with low testosterone include:    A decrease in hemoglobin and mild anemia  A decrease in body hair  Thinning of the bones (osteoporosis)  Increased body fat  Breast development (gynecomastia)    How is low testosterone diagnosed? Low testosterone is diagnosed by measuring the amount of testosterone in the blood with a blood test  It may take several measurements to determine if a patient has low testosterone, since levels tend to change throughout the day  The highest levels of testosterone are generally in the morning, near 8:00 a m  This is why doctors prefer to measure testosterone levels in the early morning  How is low testosterone treated? Low testosterone is treated with testosterone replacement therapy, which can be given in several different ways:    Intramuscular injections (into a muscle), usually every 10 to 14 days; Testosterone patches  These are used every day and are applied to different parts of the body, including the buttocks, arms, back, and abdomen  Testosterone gels that are applied every day to the clean dry skin of the upper back and arms  (The gels require care in making sure that the hormone is not accidentally transferred to another person or partner )  Pellets that are implanted under the skin every two months  (Oral testosterone is not approved for use in the [place] )    What are the benefits of testosterone replacement therapy?     Potential benefits of testosterone replacement therapy may include: In boys, avoiding problems related to delayed puberty  Loss of fat  Increased bone density and protection against osteoporosis  Improved mood and sense of well-being  Improved sexual function  Improved mental sharpness  Greater muscle strength and physical performance  What are the side effects of testosterone replacement therapy? The side effects of testosterone replacement therapy include:    Acne or oily skin  Swelling in the ankles caused by mild fluid retention  Stimulation of the prostate, which can cause urination symptoms such as difficulty urinating  Breast enlargement or tenderness  Worsening of sleep apnea (a sleep disorder that results in frequent nighttime awakenings and daytime sleepiness)  Smaller testicles  Skin irritation  Laboratory abnormalities that can occur with testosterone replacement include: Increase in prostate-specific antigen (PSA)  Increase in red blood cell count  Decrease in sperm count, producing infertility (inability to have children), which is especially important in younger men who desire fertility  If you are taking hormone replacement therapy, regular follow-up appointments with your physician are important:    PSA levels should be checked at 3, 6, and 12 months within the first year, and then every year after that  A digital rectal examination of the prostate should be done at 3-6 months and 1 year after beginning therapy, and then every year after that  This is recommended even for men who are not on testosterone replacement therapy, as an age-related prostate cancer screening  This typically begins at age [de-identified]  Hematocrit levels will be checked before testosterone therapy starts, and then on a regular basis to make sure red blood cell levels remain normal     Who shouldn't take testosterone replacement therapy? Testosterone replacement therapy may cause the prostate to grow   If a man has early prostate cancer, there is concern that testosterone may stimulate the cancer's growth  Therefore, men who have prostate cancer should not take testosterone replacement therapy  It is important for all men considering testosterone replacement therapy to undergo prostate screening before starting this therapy  Other men who should not take testosterone replacement therapy include those who have: An enlarged prostate resulting in urinary symptoms (difficulty starting a urinary stream); A lump on their prostate that has not been evaluated; A PSA measurement above 4;  Breast cancer; An elevated hematocrit level (higher-than-normal number of red blood cells); Severe congestive heart failure; Obstructive sleep apnea that has not been treated

## 2022-12-09 ENCOUNTER — OFFICE VISIT (OUTPATIENT)
Dept: UROLOGY | Facility: CLINIC | Age: 57
End: 2022-12-09

## 2022-12-09 VITALS
BODY MASS INDEX: 27.79 KG/M2 | WEIGHT: 209.7 LBS | SYSTOLIC BLOOD PRESSURE: 140 MMHG | OXYGEN SATURATION: 99 % | HEIGHT: 73 IN | HEART RATE: 84 BPM | DIASTOLIC BLOOD PRESSURE: 90 MMHG

## 2022-12-09 DIAGNOSIS — N40.1 BENIGN LOCALIZED PROSTATIC HYPERPLASIA WITH LOWER URINARY TRACT SYMPTOMS (LUTS): ICD-10-CM

## 2022-12-09 DIAGNOSIS — E29.1 HYPOGONADISM IN MALE: ICD-10-CM

## 2022-12-09 DIAGNOSIS — R68.82 LOW LIBIDO: ICD-10-CM

## 2022-12-09 DIAGNOSIS — R79.89 LOW TESTOSTERONE IN MALE: Primary | ICD-10-CM

## 2022-12-09 DIAGNOSIS — N52.01 ERECTILE DYSFUNCTION DUE TO ARTERIAL INSUFFICIENCY: ICD-10-CM

## 2022-12-09 DIAGNOSIS — Z79.890 LONG-TERM CURRENT USE OF TESTOSTERONE REPLACEMENT THERAPY: ICD-10-CM

## 2022-12-09 RX ORDER — TESTOSTERONE GEL, 1% 10 MG/G
50 GEL TRANSDERMAL DAILY
Qty: 90 PACKET | Refills: 1 | Status: SHIPPED | OUTPATIENT
Start: 2022-12-09 | End: 2023-03-09

## 2022-12-09 NOTE — ASSESSMENT & PLAN NOTE
No adverse effects from testosterone replacement therapy at this time  I have renewed these prescriptions    I will see him back in 6 months with labs prior, PSA, CBC, and testosterone levels

## 2022-12-09 NOTE — ASSESSMENT & PLAN NOTE
Tolerating testosterone replacement therapy well  Check his labs going forward in 6 months    He can then follow-up every 6 to 12 months if labs are stable

## 2022-12-09 NOTE — ASSESSMENT & PLAN NOTE
Voiding well at this time, we will track his voiding going forward via AUA symptom score and assess need for more active intervention or treatment in the future as necessary

## 2022-12-13 ENCOUNTER — APPOINTMENT (OUTPATIENT)
Dept: LAB | Facility: CLINIC | Age: 57
End: 2022-12-13

## 2022-12-13 DIAGNOSIS — E29.1 HYPOGONADISM IN MALE: ICD-10-CM

## 2022-12-13 DIAGNOSIS — R79.89 LOW TESTOSTERONE IN MALE: ICD-10-CM

## 2022-12-13 LAB
BASOPHILS # BLD AUTO: 0.03 THOUSANDS/ÂΜL (ref 0–0.1)
BASOPHILS NFR BLD AUTO: 1 % (ref 0–1)
EOSINOPHIL # BLD AUTO: 0.12 THOUSAND/ÂΜL (ref 0–0.61)
EOSINOPHIL NFR BLD AUTO: 3 % (ref 0–6)
ERYTHROCYTE [DISTWIDTH] IN BLOOD BY AUTOMATED COUNT: 11.8 % (ref 11.6–15.1)
HCT VFR BLD AUTO: 44.4 % (ref 36.5–49.3)
HGB BLD-MCNC: 15 G/DL (ref 12–17)
IMM GRANULOCYTES # BLD AUTO: 0.01 THOUSAND/UL (ref 0–0.2)
IMM GRANULOCYTES NFR BLD AUTO: 0 % (ref 0–2)
LYMPHOCYTES # BLD AUTO: 2.11 THOUSANDS/ÂΜL (ref 0.6–4.47)
LYMPHOCYTES NFR BLD AUTO: 47 % (ref 14–44)
MCH RBC QN AUTO: 30.2 PG (ref 26.8–34.3)
MCHC RBC AUTO-ENTMCNC: 33.8 G/DL (ref 31.4–37.4)
MCV RBC AUTO: 89 FL (ref 82–98)
MONOCYTES # BLD AUTO: 0.56 THOUSAND/ÂΜL (ref 0.17–1.22)
MONOCYTES NFR BLD AUTO: 13 % (ref 4–12)
NEUTROPHILS # BLD AUTO: 1.56 THOUSANDS/ÂΜL (ref 1.85–7.62)
NEUTS SEG NFR BLD AUTO: 36 % (ref 43–75)
NRBC BLD AUTO-RTO: 0 /100 WBCS
PLATELET # BLD AUTO: 291 THOUSANDS/UL (ref 149–390)
PMV BLD AUTO: 9.7 FL (ref 8.9–12.7)
PSA SERPL-MCNC: 0.8 NG/ML (ref 0–4)
RBC # BLD AUTO: 4.97 MILLION/UL (ref 3.88–5.62)
WBC # BLD AUTO: 4.39 THOUSAND/UL (ref 4.31–10.16)

## 2022-12-15 LAB
TESTOST FREE SERPL-MCNC: 15.6 PG/ML (ref 7.2–24)
TESTOST SERPL-MCNC: 344 NG/DL (ref 264–916)

## 2023-01-03 ENCOUNTER — PATIENT MESSAGE (OUTPATIENT)
Dept: UROLOGY | Facility: CLINIC | Age: 58
End: 2023-01-03

## 2023-01-03 DIAGNOSIS — E29.1 HYPOGONADISM IN MALE: ICD-10-CM

## 2023-01-05 ENCOUNTER — TELEPHONE (OUTPATIENT)
Dept: UROLOGY | Facility: AMBULATORY SURGERY CENTER | Age: 58
End: 2023-01-05

## 2023-01-05 NOTE — TELEPHONE ENCOUNTER
----- Message from Perla Herndon sent at 1/4/2023  5:39 PM EST -----  Regarding: script refill issue  Contact: [phone] thanks

## 2023-01-06 ENCOUNTER — TELEPHONE (OUTPATIENT)
Dept: UROLOGY | Facility: CLINIC | Age: 58
End: 2023-01-06

## 2023-01-06 RX ORDER — TESTOSTERONE GEL, 1% 10 MG/G
50 GEL TRANSDERMAL DAILY
Qty: 90 PACKET | Refills: 1 | Status: SHIPPED | OUTPATIENT
Start: 2023-01-06 | End: 2023-04-06

## 2023-01-06 NOTE — PATIENT COMMUNICATION
- Likely demand due to #1.  - No angina.  - Trend serial CE's; Check EKG.  - Continue ASA and BB.  - Cardiology consult in AM.   Attempted to call [facility] to ensure they got the prescription we sent now for the 3rd time  Phone just rang continuously with never being picked up

## 2023-01-06 NOTE — TELEPHONE ENCOUNTER
From: Teetee Alvarado  To: Darryle Boston Verges  Sent: 1/3/2023 5:24 PM EST  Subject: script refill issue    testosterone 1%  Commonly known as: ANDROGEL  I think you requested at least once?   [facility] is again saying they have no record of the script refill    Thanks

## 2023-02-08 DIAGNOSIS — E29.1 HYPOGONADISM IN MALE: ICD-10-CM

## 2023-02-08 RX ORDER — TESTOSTERONE GEL, 1% 10 MG/G
50 GEL TRANSDERMAL DAILY
Qty: 90 PACKET | Refills: 0 | Status: SHIPPED | OUTPATIENT
Start: 2023-02-08 | End: 2023-05-09

## 2023-02-23 DIAGNOSIS — E34.9 TESTOSTERONE DEFICIENCY: Primary | ICD-10-CM

## 2023-02-23 RX ORDER — TESTOSTERONE CYPIONATE 200 MG/ML
100 INJECTION, SOLUTION INTRAMUSCULAR
Qty: 10 ML | Refills: 3 | Status: SHIPPED | OUTPATIENT
Start: 2023-02-23 | End: 2023-03-02 | Stop reason: SDUPTHER

## 2023-03-02 DIAGNOSIS — E34.9 TESTOSTERONE DEFICIENCY: ICD-10-CM

## 2023-03-02 RX ORDER — TESTOSTERONE CYPIONATE 200 MG/ML
100 INJECTION, SOLUTION INTRAMUSCULAR
Qty: 10 ML | Refills: 3 | Status: SHIPPED | OUTPATIENT
Start: 2023-03-02 | End: 2023-06-12 | Stop reason: SDUPTHER

## 2023-03-08 DIAGNOSIS — E29.1 MALE HYPOGONADISM: Primary | ICD-10-CM

## 2023-06-12 ENCOUNTER — OFFICE VISIT (OUTPATIENT)
Dept: UROLOGY | Facility: CLINIC | Age: 58
End: 2023-06-12
Payer: COMMERCIAL

## 2023-06-12 VITALS
BODY MASS INDEX: 26.51 KG/M2 | HEART RATE: 88 BPM | DIASTOLIC BLOOD PRESSURE: 82 MMHG | HEIGHT: 73 IN | WEIGHT: 200 LBS | OXYGEN SATURATION: 98 % | SYSTOLIC BLOOD PRESSURE: 124 MMHG | RESPIRATION RATE: 18 BRPM

## 2023-06-12 DIAGNOSIS — E34.9 TESTOSTERONE DEFICIENCY: ICD-10-CM

## 2023-06-12 DIAGNOSIS — E29.1 MALE HYPOGONADISM: Primary | ICD-10-CM

## 2023-06-12 PROCEDURE — 99213 OFFICE O/P EST LOW 20 MIN: CPT | Performed by: PHYSICIAN ASSISTANT

## 2023-06-12 RX ORDER — TESTOSTERONE CYPIONATE 200 MG/ML
100 INJECTION, SOLUTION INTRAMUSCULAR
Qty: 10 ML | Refills: 4 | Status: SHIPPED | OUTPATIENT
Start: 2023-06-12

## 2023-06-12 RX ORDER — METOPROLOL SUCCINATE 25 MG/1
25 TABLET, EXTENDED RELEASE ORAL DAILY
COMMUNITY
Start: 2023-04-26

## 2023-06-12 NOTE — PROGRESS NOTES
"  UROLOGY PROGRESS NOTE   Patient Identifiers: Sharon Red (MRN [id number])  Date of Service: 6/12/2023    Subjective:   62-year-old man with history of BPH and testosterone deficiency  He has been on replacement injections weekly  Testosterone 344  H&H is normal   PSA 0 8  No voiding complaints  Reason for visit: BPH and testosterone deficiency follow-up    Objective:     VITALS:    There were no vitals filed for this visit  LABS:  Lab Results   Component Value Date    HCT 44 4 12/13/2022    HGB 15 0 12/13/2022     12/13/2022    WBC 4 39 12/13/2022   ]    No results found for: \"BUN\", \"CALCIUM\", \"CL\", \"CO2\", \"CREATININE\", \"GLUCOSE\", \"K\", \"NA\"]        INPATIENT MEDS:    Current Outpatient Medications:   •  testosterone cypionate (DEPO-TESTOSTERONE) 200 mg/mL SOLN, Inject 0 5 mL (100 mg total) into a muscle every 7 days, Disp: 10 mL, Rfl: 3  •  allopurinol (ZYLOPRIM) 100 mg tablet, Take 100 mg by mouth daily, Disp: , Rfl:   •  aspirin (ECOTRIN LOW STRENGTH) 81 mg EC tablet, Take 81 mg by mouth daily, Disp: , Rfl:   •  nebivolol (BYSTOLIC) 2 5 mg tablet, Take 2 5 mg by mouth daily, Disp: , Rfl:   •  sildenafil (VIAGRA) 100 mg tablet, Take 100 mg by mouth, Disp: , Rfl:   •  simvastatin (ZOCOR) 40 mg tablet, Take 40 mg by mouth, Disp: , Rfl:   •  Syringe/Needle, Disp, (Syringe Luer Slip) 25G X 5/8\" 1 ML MISC, Use every 7 days, Disp: 30 each, Rfl: 3      Physical Exam:   There were no vitals taken for this visit  GEN: no acute distress    RESP: breathing comfortably with no accessory muscle use    ABD: soft, non-tender, non-distended   INCISION:    EXT: no significant peripheral edema         RADIOLOGY:   none     Assessment:   #1  Testosterone deficiency  #2    BPH    Plan:   -Follow-up in 6 months with labs prior to visit  -Prostate exam at that time  -Testosterone reordered as requested  -          "

## 2023-06-23 ENCOUNTER — TRANSCRIBE ORDERS (OUTPATIENT)
Dept: LAB | Facility: CLINIC | Age: 58
End: 2023-06-23

## 2023-06-23 ENCOUNTER — APPOINTMENT (OUTPATIENT)
Dept: LAB | Facility: CLINIC | Age: 58
End: 2023-06-23
Payer: COMMERCIAL

## 2023-06-23 DIAGNOSIS — E78.2 MIXED HYPERLIPIDEMIA: Primary | ICD-10-CM

## 2023-06-23 DIAGNOSIS — E29.1 MALE HYPOGONADISM: ICD-10-CM

## 2023-06-23 DIAGNOSIS — E78.2 MIXED HYPERLIPIDEMIA: ICD-10-CM

## 2023-06-23 LAB
CHOLEST SERPL-MCNC: 193 MG/DL
ERYTHROCYTE [DISTWIDTH] IN BLOOD BY AUTOMATED COUNT: 12 % (ref 11.6–15.1)
HCT VFR BLD AUTO: 48.3 % (ref 36.5–49.3)
HDLC SERPL-MCNC: 78 MG/DL
HGB BLD-MCNC: 16.2 G/DL (ref 12–17)
LDLC SERPL CALC-MCNC: 89 MG/DL (ref 0–100)
MCH RBC QN AUTO: 31.6 PG (ref 26.8–34.3)
MCHC RBC AUTO-ENTMCNC: 33.5 G/DL (ref 31.4–37.4)
MCV RBC AUTO: 94 FL (ref 82–98)
NONHDLC SERPL-MCNC: 115 MG/DL
PLATELET # BLD AUTO: 247 THOUSANDS/UL (ref 149–390)
PMV BLD AUTO: 9.9 FL (ref 8.9–12.7)
RBC # BLD AUTO: 5.12 MILLION/UL (ref 3.88–5.62)
TRIGL SERPL-MCNC: 131 MG/DL
WBC # BLD AUTO: 4.55 THOUSAND/UL (ref 4.31–10.16)

## 2023-06-23 PROCEDURE — 84403 ASSAY OF TOTAL TESTOSTERONE: CPT

## 2023-06-23 PROCEDURE — 80061 LIPID PANEL: CPT

## 2023-06-23 PROCEDURE — 85027 COMPLETE CBC AUTOMATED: CPT

## 2023-06-23 PROCEDURE — 36415 COLL VENOUS BLD VENIPUNCTURE: CPT

## 2023-06-23 PROCEDURE — 84402 ASSAY OF FREE TESTOSTERONE: CPT

## 2023-06-25 LAB
TESTOST FREE SERPL-MCNC: 19.4 PG/ML (ref 7.2–24)
TESTOST SERPL-MCNC: 886 NG/DL (ref 264–916)

## 2023-07-28 DIAGNOSIS — E29.1 MALE HYPOGONADISM: ICD-10-CM

## 2023-08-09 DIAGNOSIS — E29.1 MALE HYPOGONADISM: ICD-10-CM

## 2023-09-05 ENCOUNTER — CONSULT (OUTPATIENT)
Dept: INFECTIOUS DISEASES | Facility: CLINIC | Age: 58
End: 2023-09-05

## 2023-09-05 VITALS
TEMPERATURE: 97.2 F | WEIGHT: 202 LBS | RESPIRATION RATE: 17 BRPM | HEIGHT: 73 IN | OXYGEN SATURATION: 97 % | DIASTOLIC BLOOD PRESSURE: 78 MMHG | BODY MASS INDEX: 26.77 KG/M2 | HEART RATE: 103 BPM | SYSTOLIC BLOOD PRESSURE: 162 MMHG

## 2023-09-05 DIAGNOSIS — Z71.84 TRAVEL ADVICE ENCOUNTER: Primary | ICD-10-CM

## 2023-09-05 PROCEDURE — 99411 PREVENTIVE COUNSELING GROUP: CPT | Performed by: INTERNAL MEDICINE

## 2023-09-05 RX ORDER — ATOVAQUONE AND PROGUANIL HYDROCHLORIDE 250; 100 MG/1; MG/1
1 TABLET, FILM COATED ORAL
Qty: 22 TABLET | Refills: 1 | Status: SHIPPED | OUTPATIENT
Start: 2023-09-05 | End: 2023-09-27

## 2023-09-05 NOTE — PROGRESS NOTES
[facility]    Patient is traveling to countries or areas within countries where immunizations are required or recommended:   [place]    Patient states they will visit underdeveloped areas with poor sanitation Yes/No: Yes   Patient requires malaria prophylaxis Yes/No: Yes    No orders of the defined types were placed in this encounter.   Only needs Malaria meds    Patient instructed how to take medications Yes/No: Yes  Patient warned about side effects Yes/No: Yes  Patient declined Yes/No: No

## 2023-11-10 DIAGNOSIS — E34.9 TESTOSTERONE DEFICIENCY: ICD-10-CM

## 2023-11-10 RX ORDER — TESTOSTERONE CYPIONATE 200 MG/ML
100 INJECTION, SOLUTION INTRAMUSCULAR
Qty: 10 ML | Refills: 0 | Status: SHIPPED | OUTPATIENT
Start: 2023-11-10

## 2023-11-13 ENCOUNTER — TELEPHONE (OUTPATIENT)
Dept: UROLOGY | Facility: CLINIC | Age: 58
End: 2023-11-13

## 2023-11-13 NOTE — TELEPHONE ENCOUNTER
Received fax for Prior auth for     testosterone cypionate (DEPO-TESTOSTERONE) 200 mg/mL SOLN     From [facility] pharmacy started through [facility] [place]

## 2023-11-13 NOTE — TELEPHONE ENCOUNTER
APPROVAL FAXED TO PHARMACY    APPROVAL SCANNED IN MEDIA    PT MADE AWARE WITH [email or web address] MESSAGE    PA COMPLETED

## 2023-11-22 DIAGNOSIS — E29.1 HYPOGONADISM IN MALE: ICD-10-CM

## 2023-11-22 RX ORDER — TESTOSTERONE GEL, 1% 10 MG/G
50 GEL TRANSDERMAL DAILY
Qty: 5 G | Refills: 0 | Status: SHIPPED | OUTPATIENT
Start: 2023-11-22 | End: 2024-02-20

## 2023-12-04 DIAGNOSIS — E34.9 TESTOSTERONE DEFICIENCY: ICD-10-CM

## 2023-12-04 NOTE — TELEPHONE ENCOUNTER
Reason for call:   [x] Refill   [] Prior Auth  [] Other:     Office:   [] PCP/Provider -   [x] Specialty/Provider - Kaitlin     Medication: Testosterone     Dose/Frequency: 200mg/ml     Quantity: 10ml     [id number]    Does the patient have enough for 3 days? [] Yes   [] No - Send as HP to POD    Patients last script was sent in for the donovan GEL not the injectable. He would like the refill for the injectable sent in Please.

## 2023-12-05 RX ORDER — TESTOSTERONE CYPIONATE 200 MG/ML
100 INJECTION, SOLUTION INTRAMUSCULAR
Qty: 10 ML | Refills: 0 | Status: SHIPPED | OUTPATIENT
Start: 2023-12-05

## 2023-12-19 NOTE — PROGRESS NOTES
"  UROLOGY PROGRESS NOTE   Patient Identifiers: Behzad Real (MRN [id number])  Date of Service: 12/19/2023    Subjective:   58-year-old man history of BPH and testosterone deficiency.  He has been on replacement injections weekly.  His last testosterone was 886.  Last PSA was 0.8.  No problems with injections.    Reason for visit: BPH and testosterone deficiency follow-up    Objective:     VITALS:    There were no vitals filed for this visit.        LABS:  Lab Results   Component Value Date    HGB 16.2 06/23/2023    HCT 48.3 06/23/2023    WBC 4.55 06/23/2023     06/23/2023   ]    No results found for: \"NA\", \"K\", \"CL\", \"CO2\", \"BUN\", \"CREATININE\", \"CALCIUM\", \"GLUCOSE\"]        INPATIENT MEDS:    Current Outpatient Medications:     allopurinol (ZYLOPRIM) 100 mg tablet, Take 100 mg by mouth daily, Disp: , Rfl:     aspirin (ECOTRIN LOW STRENGTH) 81 mg EC tablet, Take 81 mg by mouth daily, Disp: , Rfl:     atovaquone-proguanil (MALARONE) 250-100 mg, Take 1 tablet by mouth daily with breakfast for 22 days Begin 1 day before entering malaria area and continue daily while in malaria area and for 1 week after leaving area, Disp: 22 tablet, Rfl: 1    metoprolol succinate (TOPROL-XL) 25 mg 24 hr tablet, Take 25 mg by mouth daily, Disp: , Rfl:     nebivolol (BYSTOLIC) 2.5 mg tablet, Take 2.5 mg by mouth daily, Disp: , Rfl:     sildenafil (VIAGRA) 100 mg tablet, Take 100 mg by mouth, Disp: , Rfl:     simvastatin (ZOCOR) 40 mg tablet, Take 40 mg by mouth, Disp: , Rfl:     Syringe/Needle, Disp, (Syringe Luer Slip) 25G X 5/8\" 1 ML MISC, Use every 7 days, Disp: 50 each, Rfl: 5    testosterone (ANDROGEL) 1%, APPLY 1 PACKET (50 MG TOTAL) TOPICALLY DAILY, Disp: 5 g, Rfl: 0    testosterone cypionate (DEPO-TESTOSTERONE) 200 mg/mL SOLN, Inject 0.5 mL (100 mg total) into a muscle every 7 days, Disp: 10 mL, Rfl: 0      Physical Exam:   There were no vitals taken for this visit.  GEN: no acute distress    RESP: breathing comfortably " with no accessory muscle use    ABD: soft, non-tender, non-distended   INCISION:    EXT: no significant peripheral edema       RADIOLOGY:   none     Assessment:   1.  Testosterone deficiency  2.  BPH    Plan:   -Get labs now and in 6 months prior to his next virtual appointment  -  -  -

## 2023-12-20 ENCOUNTER — OFFICE VISIT (OUTPATIENT)
Dept: UROLOGY | Facility: CLINIC | Age: 58
End: 2023-12-20
Payer: COMMERCIAL

## 2023-12-20 VITALS
HEIGHT: 73 IN | SYSTOLIC BLOOD PRESSURE: 168 MMHG | OXYGEN SATURATION: 97 % | DIASTOLIC BLOOD PRESSURE: 80 MMHG | HEART RATE: 85 BPM | WEIGHT: 201.8 LBS | BODY MASS INDEX: 26.74 KG/M2

## 2023-12-20 DIAGNOSIS — E78.2 MIXED HYPERLIPIDEMIA: Primary | ICD-10-CM

## 2023-12-20 DIAGNOSIS — E29.1 MALE HYPOGONADISM: ICD-10-CM

## 2023-12-20 DIAGNOSIS — C43.59 MALIGNANT MELANOMA OF SKIN OF TRUNK, EXCEPT SCROTUM (HCC): ICD-10-CM

## 2023-12-20 PROCEDURE — 99213 OFFICE O/P EST LOW 20 MIN: CPT | Performed by: PHYSICIAN ASSISTANT

## 2024-01-18 ENCOUNTER — APPOINTMENT (OUTPATIENT)
Dept: LAB | Facility: CLINIC | Age: 59
End: 2024-01-18
Payer: COMMERCIAL

## 2024-01-18 DIAGNOSIS — E78.2 MIXED HYPERLIPIDEMIA: ICD-10-CM

## 2024-01-18 DIAGNOSIS — C43.59 MALIGNANT MELANOMA OF SKIN OF TRUNK, EXCEPT SCROTUM (HCC): ICD-10-CM

## 2024-01-18 DIAGNOSIS — E29.1 MALE HYPOGONADISM: ICD-10-CM

## 2024-01-18 LAB
ALBUMIN SERPL BCP-MCNC: 4.7 G/DL (ref 3.5–5)
ALP SERPL-CCNC: 45 U/L (ref 34–104)
ALT SERPL W P-5'-P-CCNC: 24 U/L (ref 7–52)
ANION GAP SERPL CALCULATED.3IONS-SCNC: 5 MMOL/L
AST SERPL W P-5'-P-CCNC: 26 U/L (ref 13–39)
BASOPHILS # BLD AUTO: 0.02 THOUSANDS/ÂΜL (ref 0–0.1)
BASOPHILS NFR BLD AUTO: 1 % (ref 0–1)
BILIRUB SERPL-MCNC: 0.65 MG/DL (ref 0.2–1)
BUN SERPL-MCNC: 13 MG/DL (ref 5–25)
CALCIUM SERPL-MCNC: 9.4 MG/DL (ref 8.4–10.2)
CHLORIDE SERPL-SCNC: 93 MMOL/L (ref 96–108)
CHOLEST SERPL-MCNC: 204 MG/DL
CO2 SERPL-SCNC: 29 MMOL/L (ref 21–32)
CREAT SERPL-MCNC: 0.94 MG/DL (ref 0.6–1.3)
EOSINOPHIL # BLD AUTO: 0.11 THOUSAND/ÂΜL (ref 0–0.61)
EOSINOPHIL NFR BLD AUTO: 3 % (ref 0–6)
ERYTHROCYTE [DISTWIDTH] IN BLOOD BY AUTOMATED COUNT: 12 % (ref 11.6–15.1)
GFR SERPL CREATININE-BSD FRML MDRD: 88 ML/MIN/1.73SQ M
GLUCOSE P FAST SERPL-MCNC: 101 MG/DL (ref 65–99)
HCT VFR BLD AUTO: 50.7 % (ref 36.5–49.3)
HDLC SERPL-MCNC: 83 MG/DL
HGB BLD-MCNC: 17.1 G/DL (ref 12–17)
IMM GRANULOCYTES # BLD AUTO: 0.01 THOUSAND/UL (ref 0–0.2)
IMM GRANULOCYTES NFR BLD AUTO: 0 % (ref 0–2)
LDLC SERPL CALC-MCNC: 104 MG/DL (ref 0–100)
LYMPHOCYTES # BLD AUTO: 1.52 THOUSANDS/ÂΜL (ref 0.6–4.47)
LYMPHOCYTES NFR BLD AUTO: 37 % (ref 14–44)
MCH RBC QN AUTO: 31.3 PG (ref 26.8–34.3)
MCHC RBC AUTO-ENTMCNC: 33.7 G/DL (ref 31.4–37.4)
MCV RBC AUTO: 93 FL (ref 82–98)
MONOCYTES # BLD AUTO: 0.53 THOUSAND/ÂΜL (ref 0.17–1.22)
MONOCYTES NFR BLD AUTO: 13 % (ref 4–12)
NEUTROPHILS # BLD AUTO: 1.88 THOUSANDS/ÂΜL (ref 1.85–7.62)
NEUTS SEG NFR BLD AUTO: 46 % (ref 43–75)
NONHDLC SERPL-MCNC: 121 MG/DL
NRBC BLD AUTO-RTO: 0 /100 WBCS
PLATELET # BLD AUTO: 230 THOUSANDS/UL (ref 149–390)
PMV BLD AUTO: 10.4 FL (ref 8.9–12.7)
POTASSIUM SERPL-SCNC: 4.1 MMOL/L (ref 3.5–5.3)
PROT SERPL-MCNC: 7.6 G/DL (ref 6.4–8.4)
PSA SERPL-MCNC: 0.75 NG/ML (ref 0–4)
RBC # BLD AUTO: 5.46 MILLION/UL (ref 3.88–5.62)
SODIUM SERPL-SCNC: 127 MMOL/L (ref 135–147)
TRIGL SERPL-MCNC: 84 MG/DL
WBC # BLD AUTO: 4.07 THOUSAND/UL (ref 4.31–10.16)

## 2024-01-18 PROCEDURE — 36415 COLL VENOUS BLD VENIPUNCTURE: CPT

## 2024-01-18 PROCEDURE — 80053 COMPREHEN METABOLIC PANEL: CPT

## 2024-01-18 PROCEDURE — 84403 ASSAY OF TOTAL TESTOSTERONE: CPT

## 2024-01-18 PROCEDURE — 84153 ASSAY OF PSA TOTAL: CPT

## 2024-01-18 PROCEDURE — 85025 COMPLETE CBC W/AUTO DIFF WBC: CPT

## 2024-01-18 PROCEDURE — 84402 ASSAY OF FREE TESTOSTERONE: CPT

## 2024-01-18 PROCEDURE — 80061 LIPID PANEL: CPT

## 2024-01-19 LAB
TESTOST FREE SERPL-MCNC: 13 PG/ML (ref 7.2–24)
TESTOST SERPL-MCNC: 713 NG/DL (ref 264–916)

## 2024-03-17 DIAGNOSIS — E34.9 TESTOSTERONE DEFICIENCY: ICD-10-CM

## 2024-03-20 RX ORDER — TESTOSTERONE CYPIONATE 200 MG/ML
100 INJECTION, SOLUTION INTRAMUSCULAR
Qty: 10 ML | Refills: 0 | Status: SHIPPED | OUTPATIENT
Start: 2024-03-20

## 2024-05-21 DIAGNOSIS — E34.9 TESTOSTERONE DEFICIENCY: ICD-10-CM

## 2024-05-22 RX ORDER — TESTOSTERONE CYPIONATE 200 MG/ML
100 INJECTION, SOLUTION INTRAMUSCULAR
Qty: 10 ML | Refills: 0 | Status: SHIPPED | OUTPATIENT
Start: 2024-05-22

## 2024-05-31 ENCOUNTER — TELEPHONE (OUTPATIENT)
Age: 59
End: 2024-05-31

## 2024-05-31 NOTE — TELEPHONE ENCOUNTER
Patient called in stating that he is going to [facility] on 7/22. There are no appt available for the travel clinic at this time, patient states that he got vaccines done in September with us when he went to [place] last time so patient is not sure if he needs to get any vaccines again. Please advise.

## 2024-06-04 NOTE — TELEPHONE ENCOUNTER
Called patient advised that he only got malaria medication 09/2023. I advised him any further advice would have to be consult and we care completely booked with our travel clinic.

## 2024-06-21 ENCOUNTER — TELEMEDICINE (OUTPATIENT)
Dept: UROLOGY | Facility: CLINIC | Age: 59
End: 2024-06-21
Payer: COMMERCIAL

## 2024-06-21 DIAGNOSIS — E29.1 MALE HYPOGONADISM: Primary | ICD-10-CM

## 2024-06-21 PROCEDURE — 99213 OFFICE O/P EST LOW 20 MIN: CPT | Performed by: PHYSICIAN ASSISTANT

## 2024-06-21 NOTE — PROGRESS NOTES
Virtual Regular Visit    Verification of patient location:    Patient is located at Home in the following state in which I hold an active license [place]      Assessment;  #1.  Testosterone deficiency  #2.  BPH    Plan:  -Continue testosterone injection 100 mg weekly  -His H&H was slightly elevated I asked him to go to donate blood 1 time in the next 6 months  -Will follow-up in 6 months with labs prior to visit    Problem List Items Addressed This Visit    None  Visit Diagnoses       Male hypogonadism    -  Primary    Relevant Orders    CBC    Testosterone, free, total                 Reason for visit is   Chief Complaint   Patient presents with    Virtual Regular Visit          Encounter provider Yovanny Humphrey PA-C      Recent Visits  No visits were found meeting these conditions.  Showing recent visits within past 7 days and meeting all other requirements  Today's Visits  Date Type Provider Dept   06/21/24 Telemedicine Yovanny Humphrey PA-C  [facility]   Showing today's visits and meeting all other requirements  Future Appointments  No visits were found meeting these conditions.  Showing future appointments within next 150 days and meeting all other requirements       The patient was identified by name and date of birth. Behzad Real was informed that this is a telemedicine visit and that the visit is being conducted through the Epic Embedded platform. He agrees to proceed..  My office door was closed. No one else was in the room.  He acknowledged consent and understanding of privacy and security of the video platform. The patient has agreed to participate and understands they can discontinue the visit at any time.    Patient is aware this is a billable service.     Subjective  Behzad Real is a 59 y.o. male                                                                                                                                                                                     "                                                                                                                                                                                                                                                                                                .      HPI 59-year-old man history of BPH and testosterone deficiency.  He has been on testosterone replacement injections weekly.  Testosterone 713.  H&H 17.1 and 50.7.  PSA 0.75.    Past Medical History:   Diagnosis Date    Hyperlipemia     Hypertension     Melanoma (HCC)        Past Surgical History:   Procedure Laterality Date    ELBOW SURGERY Right     KNEE SURGERY Right     SKIN TAG REMOVAL      VASECTOMY  2003       Current Outpatient Medications   Medication Sig Dispense Refill    allopurinol (ZYLOPRIM) 100 mg tablet Take 100 mg by mouth daily      aspirin (ECOTRIN LOW STRENGTH) 81 mg EC tablet Take 81 mg by mouth daily      atovaquone-proguanil (MALARONE) 250-100 mg Take 1 tablet by mouth daily with breakfast for 22 days Begin 1 day before entering malaria area and continue daily while in malaria area and for 1 week after leaving area 22 tablet 1    metoprolol succinate (TOPROL-XL) 25 mg 24 hr tablet Take 25 mg by mouth daily      nebivolol (BYSTOLIC) 2.5 mg tablet Take 2.5 mg by mouth daily      sildenafil (VIAGRA) 100 mg tablet Take 100 mg by mouth      simvastatin (ZOCOR) 40 mg tablet Take 40 mg by mouth      Syringe/Needle, Disp, (Syringe Luer Slip) 25G X 5/8\" 1 ML MISC Use every 7 days 50 each 5    testosterone (ANDROGEL) 1% APPLY 1 PACKET (50 MG TOTAL) TOPICALLY DAILY 5 g 0    testosterone cypionate (DEPO-TESTOSTERONE) 200 mg/mL SOLN Inject 0.5 mL (100 mg total) into a muscle every 7 days 10 mL 0     No current facility-administered medications for this visit.        No Known Allergies    Review of Systems    Video Exam    There were no vitals filed for this visit.    Physical Exam     Visit Time  Total Visit Duration: " 15

## 2024-09-27 DIAGNOSIS — E34.9 TESTOSTERONE DEFICIENCY: ICD-10-CM

## 2024-09-27 RX ORDER — TESTOSTERONE CYPIONATE 200 MG/ML
100 INJECTION, SOLUTION INTRAMUSCULAR
Qty: 10 ML | Refills: 0 | Status: SHIPPED | OUTPATIENT
Start: 2024-09-27

## 2024-10-10 ENCOUNTER — OFFICE VISIT (OUTPATIENT)
Dept: OBGYN CLINIC | Facility: CLINIC | Age: 59
End: 2024-10-10
Payer: COMMERCIAL

## 2024-10-10 ENCOUNTER — APPOINTMENT (OUTPATIENT)
Dept: RADIOLOGY | Facility: CLINIC | Age: 59
End: 2024-10-10
Payer: COMMERCIAL

## 2024-10-10 VITALS
HEIGHT: 73 IN | HEART RATE: 81 BPM | BODY MASS INDEX: 26.83 KG/M2 | DIASTOLIC BLOOD PRESSURE: 92 MMHG | WEIGHT: 202.4 LBS | SYSTOLIC BLOOD PRESSURE: 156 MMHG

## 2024-10-10 DIAGNOSIS — M17.31 POST-TRAUMATIC OSTEOARTHRITIS OF RIGHT KNEE: Primary | ICD-10-CM

## 2024-10-10 DIAGNOSIS — M25.561 RIGHT KNEE PAIN, UNSPECIFIED CHRONICITY: ICD-10-CM

## 2024-10-10 DIAGNOSIS — G89.29 CHRONIC PAIN OF RIGHT KNEE: ICD-10-CM

## 2024-10-10 DIAGNOSIS — M25.561 CHRONIC PAIN OF RIGHT KNEE: ICD-10-CM

## 2024-10-10 DIAGNOSIS — Z98.890 HISTORY OF RECONSTRUCTION OF ANTERIOR CRUCIATE LIGAMENT TEAR: ICD-10-CM

## 2024-10-10 PROCEDURE — 73560 X-RAY EXAM OF KNEE 1 OR 2: CPT

## 2024-10-10 PROCEDURE — 73562 X-RAY EXAM OF KNEE 3: CPT

## 2024-10-10 PROCEDURE — 99204 OFFICE O/P NEW MOD 45 MIN: CPT | Performed by: ORTHOPAEDIC SURGERY

## 2024-10-10 PROCEDURE — 20610 DRAIN/INJ JOINT/BURSA W/O US: CPT | Performed by: ORTHOPAEDIC SURGERY

## 2024-10-10 RX ORDER — TRIAMCINOLONE ACETONIDE 40 MG/ML
80 INJECTION, SUSPENSION INTRA-ARTICULAR; INTRAMUSCULAR
Status: COMPLETED | OUTPATIENT
Start: 2024-10-10 | End: 2024-10-10

## 2024-10-10 RX ORDER — BUPIVACAINE HYDROCHLORIDE 5 MG/ML
2 INJECTION, SOLUTION EPIDURAL; INTRACAUDAL
Status: COMPLETED | OUTPATIENT
Start: 2024-10-10 | End: 2024-10-10

## 2024-10-10 RX ADMIN — BUPIVACAINE HYDROCHLORIDE 2 ML: 5 INJECTION, SOLUTION EPIDURAL; INTRACAUDAL at 10:30

## 2024-10-10 RX ADMIN — TRIAMCINOLONE ACETONIDE 80 MG: 40 INJECTION, SUSPENSION INTRA-ARTICULAR; INTRAMUSCULAR at 10:30

## 2024-10-10 NOTE — PROGRESS NOTES
Assessment/Plan:  1. Post-traumatic osteoarthritis of right knee  Large joint arthrocentesis: R knee      2. Chronic pain of right knee  XR knee 3 vw right non injury    XR knee 1 or 2 vw left      3. History of reconstruction of anterior cruciate ligament tear          Scribe Attestation      I,:  Trey Cabral MD am acting as a scribe while in the presence of the attending physician.:       I,:  Luigi Morales, DO personally performed the services described in this documentation    as scribed in my presence.:           Behzad is a very pleasant 59-year-old male with severe posttraumatic osteoarthritis of the right knee.  Given his age and very active lifestyle we would like to delay a total knee replacement for as long as possible.  He is aware right total knee replacement is inevitable.  He has not attempted a steroid injection for his right knee previously.  This was offered to him today.  He consented to and underwent a right knee corticosteroid injection today and tolerated the procedure well.  We discussed a low impact activity lifestyle moving forward.  He understands that he may receive an injection 3 months from now if desired.  Postinjection instructions were provided.  All questions and concerns were answered to the satisfaction of the patient.    Large joint arthrocentesis: R knee  Universal Protocol:  Consent: Verbal consent obtained.  Risks and benefits: risks, benefits and alternatives were discussed  Consent given by: patient  Timeout called at: 10/10/2024 11:14 AM.  Patient understanding: patient states understanding of the procedure being performed  Patient identity confirmed: verbally with patient  Supporting Documentation  Indications: pain   Procedure Details  Location: knee - R knee  Needle size: 20 G  Ultrasound guidance: no  Approach: anterolateral  Medications administered: 2 mL bupivacaine (PF) 0.5 %; 80 mg triamcinolone acetonide 40 mg/mL    Patient tolerance: patient tolerated  the procedure well with no immediate complications  Dressing:  Sterile dressing applied        Subjective: chronic right knee pain    Patient ID: Behzad Real is a 59 y.o. male.    MARIA ALEJANDRA Wen is a 59-year-old male with a multiple year history of right knee pain.  He denies any recent injury but sustained an ACL rupture and MCL tear back in the 70s for which she had an ACL reconstruction and MCL repair done.  He states that he has had 3 surgeries on his right knee.  Currently the patient reports dull achy activity related pain that is severe at times.  Pain can reach 8/10 on the pain scale especially with prolonged activity.  He feels that the pain is starting to limit him from maintaining an active lifestyle.  He denies swelling associated with the pain.  He denies locking or mechanical symptoms with the pain.  At this present time he has treated it with icing and Tylenol as well as compression.  He has not done any formal physical therapy or had any injections.  He is very active with hunting and hiking.  He is interested in discussing total knee arthroplasty and corticosteroid injection today.      Review of Systems   Constitutional:  Positive for activity change. Negative for chills and fever.   HENT:  Negative for ear pain and sore throat.    Eyes:  Negative for pain and visual disturbance.   Respiratory:  Negative for cough and shortness of breath.    Cardiovascular:  Negative for chest pain and palpitations.   Gastrointestinal:  Negative for abdominal pain and vomiting.   Genitourinary:  Negative for dysuria and hematuria.   Musculoskeletal:  Positive for arthralgias. Negative for back pain.   Skin:  Negative for color change and rash.   Neurological:  Negative for seizures and syncope.   All other systems reviewed and are negative.        Past Medical History:   Diagnosis Date    Hyperlipemia     Hypertension     Melanoma (HCC)        Past Surgical History:   Procedure Laterality Date    ELBOW SURGERY Right   "   KNEE SURGERY Right     SKIN TAG REMOVAL      VASECTOMY  2003       Family History   Problem Relation Age of Onset    Pancreatic cancer Mother     Melanoma Father     No Known Problems Sister        Social History     Occupational History    Occupation: [employer]   Tobacco Use    Smoking status: Never    Smokeless tobacco: Never   Vaping Use    Vaping status: Never Used   Substance and Sexual Activity    Alcohol use: Yes     Comment: occ    Drug use: Never    Sexual activity: Yes     Comment: Defer         Current Outpatient Medications:     allopurinol (ZYLOPRIM) 100 mg tablet, Take 100 mg by mouth daily, Disp: , Rfl:     aspirin (ECOTRIN LOW STRENGTH) 81 mg EC tablet, Take 81 mg by mouth daily, Disp: , Rfl:     metoprolol succinate (TOPROL-XL) 25 mg 24 hr tablet, Take 25 mg by mouth daily, Disp: , Rfl:     nebivolol (BYSTOLIC) 2.5 mg tablet, Take 2.5 mg by mouth daily, Disp: , Rfl:     sildenafil (VIAGRA) 100 mg tablet, Take 100 mg by mouth, Disp: , Rfl:     simvastatin (ZOCOR) 40 mg tablet, Take 40 mg by mouth, Disp: , Rfl:     Syringe/Needle, Disp, (Syringe Luer Slip) 25G X 5/8\" 1 ML MISC, Use every 7 days, Disp: 50 each, Rfl: 5    testosterone cypionate (DEPO-TESTOSTERONE) 200 mg/mL SOLN, Inject 0.5 mL (100 mg total) into a muscle every 7 days, Disp: 10 mL, Rfl: 0    atovaquone-proguanil (MALARONE) 250-100 mg, Take 1 tablet by mouth daily with breakfast for 22 days Begin 1 day before entering malaria area and continue daily while in malaria area and for 1 week after leaving area, Disp: 22 tablet, Rfl: 1    testosterone (ANDROGEL) 1%, APPLY 1 PACKET (50 MG TOTAL) TOPICALLY DAILY, Disp: 5 g, Rfl: 0    No Known Allergies    Objective:  Vitals:    10/10/24 1026   BP: 156/92   Pulse: 81       Body mass index is 26.7 kg/m².    Right Knee Exam     Muscle Strength   The patient has normal right knee strength.    Tenderness   The patient is experiencing tenderness in the lateral joint line and medial joint " line.    Range of Motion   Extension:  0   Flexion:  120     Tests   Varus: negative Valgus: negative    Other   Erythema: absent  Scars: present  Sensation: normal  Pulse: present  Swelling: mild  Effusion: no effusion present    Comments:  Well healed incisions over the medial joint line and medial patella  Clean, dry, and intact no sign of drainage or infection  Knee is stable to stress on exam at 5, 30, and 90 degrees  Patella tracks midline and flat with crepitus  Calf compartments are soft and supple  (-) Elio's sign  2+ DP and PT pulses with brisk capillary refill to the toes  Sural, saphenous, tibial, superficial, and deep peroneal motor and sensory distributions intact  Sensation light touch intact distally            Observations     Right Knee   Negative for effusion.         Physical Exam  Vitals and nursing note reviewed.   Constitutional:       Appearance: Normal appearance.   HENT:      Head: Normocephalic.      Right Ear: External ear normal.      Left Ear: External ear normal.   Eyes:      Extraocular Movements: Extraocular movements intact.      Conjunctiva/sclera: Conjunctivae normal.   Cardiovascular:      Rate and Rhythm: Normal rate.      Pulses: Normal pulses.   Pulmonary:      Effort: Pulmonary effort is normal.      Breath sounds: Normal breath sounds.   Abdominal:      General: Abdomen is flat.   Musculoskeletal:         General: Normal range of motion.      Cervical back: Normal range of motion and neck supple.      Comments: See ortho exam   Skin:     General: Skin is warm and dry.   Neurological:      General: No focal deficit present.      Mental Status: alert.   Psychiatric:         Mood and Affect: Mood normal.         Behavior: Behavior normal.       I have personally reviewed pertinent films in PACS.      X-rays of the right knee demonstrate severe posttraumatic osteoarthritis involving all 3 compartments with osteophyte formation on the medial femoral condyle as well as the  undersurface of the patella.  There is also a bone staple from previous ACL reconstruction in the tibia.  No fractures or dislocations.

## 2024-11-15 ENCOUNTER — APPOINTMENT (OUTPATIENT)
Dept: LAB | Facility: CLINIC | Age: 59
End: 2024-11-15
Payer: COMMERCIAL

## 2024-11-15 ENCOUNTER — TRANSCRIBE ORDERS (OUTPATIENT)
Dept: LAB | Facility: CLINIC | Age: 59
End: 2024-11-15

## 2024-11-15 DIAGNOSIS — E78.5 HYPERLIPIDEMIA, UNSPECIFIED HYPERLIPIDEMIA TYPE: Primary | ICD-10-CM

## 2024-11-15 DIAGNOSIS — E29.1 MALE HYPOGONADISM: ICD-10-CM

## 2024-11-15 DIAGNOSIS — E78.5 HYPERLIPIDEMIA, UNSPECIFIED HYPERLIPIDEMIA TYPE: ICD-10-CM

## 2024-11-15 LAB
ALBUMIN SERPL BCG-MCNC: 4.2 G/DL (ref 3.5–5)
ALP SERPL-CCNC: 46 U/L (ref 34–104)
ALT SERPL W P-5'-P-CCNC: 26 U/L (ref 7–52)
ANION GAP SERPL CALCULATED.3IONS-SCNC: 9 MMOL/L (ref 4–13)
AST SERPL W P-5'-P-CCNC: 22 U/L (ref 13–39)
BASOPHILS # BLD AUTO: 0.01 THOUSANDS/ÂΜL (ref 0–0.1)
BASOPHILS NFR BLD AUTO: 0 % (ref 0–1)
BILIRUB SERPL-MCNC: 0.55 MG/DL (ref 0.2–1)
BUN SERPL-MCNC: 8 MG/DL (ref 5–25)
CALCIUM SERPL-MCNC: 8.6 MG/DL (ref 8.4–10.2)
CHLORIDE SERPL-SCNC: 101 MMOL/L (ref 96–108)
CHOLEST SERPL-MCNC: 153 MG/DL (ref ?–200)
CO2 SERPL-SCNC: 28 MMOL/L (ref 21–32)
CREAT SERPL-MCNC: 0.89 MG/DL (ref 0.6–1.3)
EOSINOPHIL # BLD AUTO: 0.06 THOUSAND/ÂΜL (ref 0–0.61)
EOSINOPHIL NFR BLD AUTO: 1 % (ref 0–6)
ERYTHROCYTE [DISTWIDTH] IN BLOOD BY AUTOMATED COUNT: 12.1 % (ref 11.6–15.1)
GFR SERPL CREATININE-BSD FRML MDRD: 92 ML/MIN/1.73SQ M
GLUCOSE P FAST SERPL-MCNC: 103 MG/DL (ref 65–99)
HCT VFR BLD AUTO: 49.4 % (ref 36.5–49.3)
HDLC SERPL-MCNC: 69 MG/DL
HGB BLD-MCNC: 16.5 G/DL (ref 12–17)
IMM GRANULOCYTES # BLD AUTO: 0.02 THOUSAND/UL (ref 0–0.2)
IMM GRANULOCYTES NFR BLD AUTO: 0 % (ref 0–2)
LDLC SERPL CALC-MCNC: 67 MG/DL (ref 0–100)
LYMPHOCYTES # BLD AUTO: 1.39 THOUSANDS/ÂΜL (ref 0.6–4.47)
LYMPHOCYTES NFR BLD AUTO: 20 % (ref 14–44)
MCH RBC QN AUTO: 31.9 PG (ref 26.8–34.3)
MCHC RBC AUTO-ENTMCNC: 33.4 G/DL (ref 31.4–37.4)
MCV RBC AUTO: 96 FL (ref 82–98)
MONOCYTES # BLD AUTO: 0.75 THOUSAND/ÂΜL (ref 0.17–1.22)
MONOCYTES NFR BLD AUTO: 11 % (ref 4–12)
NEUTROPHILS # BLD AUTO: 4.76 THOUSANDS/ÂΜL (ref 1.85–7.62)
NEUTS SEG NFR BLD AUTO: 68 % (ref 43–75)
NRBC BLD AUTO-RTO: 0 /100 WBCS
PLATELET # BLD AUTO: 227 THOUSANDS/UL (ref 149–390)
PMV BLD AUTO: 9.8 FL (ref 8.9–12.7)
POTASSIUM SERPL-SCNC: 4.3 MMOL/L (ref 3.5–5.3)
PROT SERPL-MCNC: 6.5 G/DL (ref 6.4–8.4)
PSA SERPL-MCNC: 0.62 NG/ML (ref 0–4)
RBC # BLD AUTO: 5.17 MILLION/UL (ref 3.88–5.62)
SODIUM SERPL-SCNC: 138 MMOL/L (ref 135–147)
TRIGL SERPL-MCNC: 86 MG/DL (ref ?–150)
WBC # BLD AUTO: 6.99 THOUSAND/UL (ref 4.31–10.16)

## 2024-11-15 PROCEDURE — G0103 PSA SCREENING: HCPCS

## 2024-11-15 PROCEDURE — 80053 COMPREHEN METABOLIC PANEL: CPT

## 2024-11-15 PROCEDURE — 36415 COLL VENOUS BLD VENIPUNCTURE: CPT

## 2024-11-15 PROCEDURE — 80061 LIPID PANEL: CPT

## 2024-11-16 LAB
TESTOST FREE SERPL-MCNC: 16.1 PG/ML (ref 6.6–18.1)
TESTOST SERPL-MCNC: 668 NG/DL (ref 264–916)

## 2024-11-27 DIAGNOSIS — E34.9 TESTOSTERONE DEFICIENCY: ICD-10-CM

## 2024-11-29 NOTE — TELEPHONE ENCOUNTER
Medication:  PDMP  09/27/2024 09/27/2024 Testosterone Cypionate (Oil) 10.0 28 200 MG/1 ML NA EMILY VELÁZQUEZ [facility] Commercial Insurance 0 / 0 [place]   1 [id number] 06/27/2024 06/27/2024 Testosterone Cypionate (Oil) 10.0 28 200 MG/1 ML NA THERESA MORRIS [facility] Commercial Insurance 0 / 0 [place]   1 [id number] 05/29/2024 05/22/2024 Testosterone Cypionate (Oil) 10.0 28 200 MG/1 ML NA DELFINO AMIN [facility] Commercial Insurance 0 / 0  Active agreement on file -

## 2024-12-02 RX ORDER — TESTOSTERONE CYPIONATE 200 MG/ML
100 INJECTION, SOLUTION INTRAMUSCULAR
Qty: 10 ML | Refills: 0 | Status: SHIPPED | OUTPATIENT
Start: 2024-12-02

## 2025-01-09 ENCOUNTER — OFFICE VISIT (OUTPATIENT)
Dept: UROLOGY | Facility: CLINIC | Age: 60
End: 2025-01-09
Payer: COMMERCIAL

## 2025-01-09 VITALS
WEIGHT: 201 LBS | BODY MASS INDEX: 26.64 KG/M2 | HEIGHT: 73 IN | HEART RATE: 84 BPM | OXYGEN SATURATION: 96 % | DIASTOLIC BLOOD PRESSURE: 86 MMHG | SYSTOLIC BLOOD PRESSURE: 150 MMHG

## 2025-01-09 DIAGNOSIS — E34.9 TESTOSTERONE DEFICIENCY: Primary | ICD-10-CM

## 2025-01-09 PROCEDURE — 99213 OFFICE O/P EST LOW 20 MIN: CPT | Performed by: PHYSICIAN ASSISTANT

## 2025-01-09 RX ORDER — METOPROLOL SUCCINATE 50 MG/1
50 TABLET, EXTENDED RELEASE ORAL DAILY
COMMUNITY
Start: 2024-11-16

## 2025-01-09 RX ORDER — TESTOSTERONE CYPIONATE 200 MG/ML
100 INJECTION, SOLUTION INTRAMUSCULAR
Qty: 10 ML | Refills: 5 | Status: SHIPPED | OUTPATIENT
Start: 2025-01-09

## 2025-01-09 NOTE — PROGRESS NOTES
UROLOGY PROGRESS NOTE   Patient Identifiers: Behzad Real (MRN [id number])  Date of Service: 1/9/2025    Subjective:   59-year-old male history of BPH and testosterone deficiency.  He has been on replacement injections 100 mg weekly with good results.  His labs have been stable.  PSA is 0.62.  No voiding complaints.  He has been donating blood once a year which is managed his erythrocytosis which was only mild.  Testosterone reordered.    Reason for visit: Testosterone deficiency and BPH follow-up    Objective:     VITALS:    There were no vitals filed for this visit.  AUA SYMPTOM SCORE      Flowsheet Row Most Recent Value   AUA SYMPTOM SCORE    How often have you had a sensation of not emptying your bladder completely after you finished urinating? 0 (P)     How often have you had to urinate again less than two hours after you finished urinating? 0 (P)     How often have you found you stopped and started again several times when you urinate? 0 (P)     How often have you found it difficult to postpone urination? 0 (P)     How often have you had a weak urinary stream? 0 (P)     How often have you had to push or strain to begin urination? 0 (P)     How many times did you most typically get up to urinate from the time you went to bed at night until the time you got up in the morning? 1 (P)     Quality of Life: If you were to spend the rest of your life with your urinary condition just the way it is now, how would you feel about that? 0 (P)     AUA SYMPTOM SCORE 1 (P)                LABS:  Lab Results   Component Value Date    HGB 16.5 11/15/2024    HCT 49.4 (H) 11/15/2024    WBC 6.99 11/15/2024     11/15/2024   ]    Lab Results   Component Value Date    K 4.3 11/15/2024     11/15/2024    CO2 28 11/15/2024    BUN 8 11/15/2024    CREATININE 0.89 11/15/2024    CALCIUM 8.6 11/15/2024   ]        INPATIENT MEDS:    Current Outpatient Medications:     metoprolol succinate (TOPROL-XL) 50 mg 24 hr tablet, Take 50  "mg by mouth daily, Disp: , Rfl:     allopurinol (ZYLOPRIM) 100 mg tablet, Take 100 mg by mouth daily, Disp: , Rfl:     aspirin (ECOTRIN LOW STRENGTH) 81 mg EC tablet, Take 81 mg by mouth daily, Disp: , Rfl:     atovaquone-proguanil (MALARONE) 250-100 mg, Take 1 tablet by mouth daily with breakfast for 22 days Begin 1 day before entering malaria area and continue daily while in malaria area and for 1 week after leaving area, Disp: 22 tablet, Rfl: 1    nebivolol (BYSTOLIC) 2.5 mg tablet, Take 2.5 mg by mouth daily, Disp: , Rfl:     sildenafil (VIAGRA) 100 mg tablet, Take 100 mg by mouth, Disp: , Rfl:     simvastatin (ZOCOR) 40 mg tablet, Take 40 mg by mouth, Disp: , Rfl:     Syringe/Needle, Disp, (Syringe Luer Slip) 25G X 5/8\" 1 ML MISC, Use every 7 days, Disp: 50 each, Rfl: 5    testosterone (ANDROGEL) 1%, APPLY 1 PACKET (50 MG TOTAL) TOPICALLY DAILY, Disp: 5 g, Rfl: 0    testosterone cypionate (DEPO-TESTOSTERONE) 200 mg/mL SOLN, Inject 0.5 mL (100 mg total) into a muscle every 7 days, Disp: 10 mL, Rfl: 0      Physical Exam:   There were no vitals taken for this visit.  GEN: no acute distress    RESP: breathing comfortably with no accessory muscle use    ABD: soft, non-tender, non-distended   INCISION:    EXT: no significant peripheral edema   (Male): Penis circumcised, phallus normal, meatus patent.  Testicles descended into scrotum bilaterally without masses nor tenderness.  No inguinal hernias bilaterally.  JESU: Prostate enlarged at 35 grams. The prostate is not boggy. The prostate is not tender.  No nodules noted      RADIOLOGY:   none     Assessment:   #1.  BPH  #2.  Testosterone deficiency    Plan:   -6 months virtual follow-up with labs prior to visit  -Testosterone reordered  -  -          "

## 2025-04-03 DIAGNOSIS — E34.9 TESTOSTERONE DEFICIENCY: ICD-10-CM

## 2025-04-03 NOTE — TELEPHONE ENCOUNTER
Medication:  PDMP 01/09/2025 01/09/2025 Testosterone Cypionate (Oil) 10.0 140 200 MG/1 ML NA [employer] Commercial Insurance 0 / 5 [place]   1 [id number] 12/18/2024 12/02/2024 Testosterone Cypionate (Oil) 4.0 28 200 MG/1 ML NA DELFINO AMIN  Refill must be reviewed and completed by the office or provider. The refill is unable to be approved or denied by the medication management team.

## 2025-04-04 RX ORDER — TESTOSTERONE CYPIONATE 200 MG/ML
100 INJECTION, SOLUTION INTRAMUSCULAR
Qty: 10 ML | Refills: 0 | Status: SHIPPED | OUTPATIENT
Start: 2025-04-04

## 2025-04-07 NOTE — TELEPHONE ENCOUNTER
Patient called requesting testosterone cypionate 2,000 mg/10mL be sent instead of 200 mg. Please call patient back when refill is sent.     Pt callback: [phone]

## 2025-04-15 DIAGNOSIS — E34.9 TESTOSTERONE DEFICIENCY: ICD-10-CM

## 2025-04-15 RX ORDER — TESTOSTERONE CYPIONATE 200 MG/ML
100 INJECTION, SOLUTION INTRAMUSCULAR
Qty: 10 ML | Refills: 0 | Status: SHIPPED | OUTPATIENT
Start: 2025-04-15 | End: 2025-04-16 | Stop reason: SDUPTHER

## 2025-04-15 NOTE — TELEPHONE ENCOUNTER
Patient was informed that 10 mL via was sent to pharmacy. He pays out of pocket anyway. Knows to call office with any questions or concerns. Pt verbalized understanding.

## 2025-04-16 DIAGNOSIS — E34.9 TESTOSTERONE DEFICIENCY: ICD-10-CM

## 2025-04-16 RX ORDER — TESTOSTERONE CYPIONATE 200 MG/ML
100 INJECTION, SOLUTION INTRAMUSCULAR
Qty: 10 ML | Refills: 0 | Status: SHIPPED | OUTPATIENT
Start: 2025-04-16

## 2025-04-16 NOTE — TELEPHONE ENCOUNTER
Not a duplicate  Patient needs prescription to get resent to [facility], per his initial request.    Reason for call:   [x] Refill   [] Prior Auth  [] Other:     Office:   [x] Specialty/Provider - uro / [place]    Medication: testosterone injection    Dose/Frequency: 200mg/mL; 0.5mL every 7 days    Quantity: 10mL    Pharmacy: [facility]pharm[facility][id number] - [place] - [address]

## 2025-05-01 ENCOUNTER — TELEPHONE (OUTPATIENT)
Dept: UROLOGY | Facility: AMBULATORY SURGERY CENTER | Age: 60
End: 2025-05-01

## 2025-05-01 NOTE — TELEPHONE ENCOUNTER
[id number] - [place] - [address]         testosterone cypionate (DEPO-TESTOSTERONE) 200 mg/mL SOLN

## 2025-05-01 NOTE — TELEPHONE ENCOUNTER
PA for TESTOSTERONE  MG SUBMITTED to [employer]    via      [x][employer]-Case ID # [id number]       [x]PA sent as URGENT    All office notes, labs and other pertaining documents and studies sent. Clinical questions answered. Awaiting determination from insurance company.     Turnaround time for your insurance to make a decision on your Prior Authorization can take 7-21 business days.

## 2025-05-02 NOTE — TELEPHONE ENCOUNTER
PA for TESTOSTERONE  MG  APPROVED     Date(s) approved UNTIL 05/01/2026    Case #    Patient advised by          [x]MyChart Message  []Phone call   [x]LMOM  []L/M to call office as no active Communication consent on file  []Unable to leave detailed message as VM not approved on Communication consent       Pharmacy advised by    [x]Fax  []Phone call  []Secure Chat    Specialty Pharmacy    []     Approval letter scanned into [employer] WILL SCAN UPON RECEIPT

## 2025-05-09 DIAGNOSIS — E34.9 TESTOSTERONE DEFICIENCY: ICD-10-CM

## 2025-05-09 RX ORDER — TESTOSTERONE CYPIONATE 200 MG/ML
100 INJECTION, SOLUTION INTRAMUSCULAR
Qty: 10 ML | Refills: 0 | Status: SHIPPED | OUTPATIENT
Start: 2025-05-09

## 2025-06-04 ENCOUNTER — APPOINTMENT (OUTPATIENT)
Dept: LAB | Facility: CLINIC | Age: 60
End: 2025-06-04
Payer: COMMERCIAL

## 2025-06-04 DIAGNOSIS — E78.2 MIXED HYPERLIPIDEMIA: ICD-10-CM

## 2025-06-04 DIAGNOSIS — N42.9 DISEASE OF PROSTATE: ICD-10-CM

## 2025-06-04 DIAGNOSIS — E34.9 TESTOSTERONE DEFICIENCY: ICD-10-CM

## 2025-06-04 DIAGNOSIS — I10 ESSENTIAL HYPERTENSION, MALIGNANT: ICD-10-CM

## 2025-06-04 LAB
ANION GAP SERPL CALCULATED.3IONS-SCNC: 9 MMOL/L (ref 4–13)
BUN SERPL-MCNC: 12 MG/DL (ref 5–25)
CALCIUM SERPL-MCNC: 9 MG/DL (ref 8.4–10.2)
CHLORIDE SERPL-SCNC: 99 MMOL/L (ref 96–108)
CHOLEST SERPL-MCNC: 154 MG/DL (ref ?–200)
CO2 SERPL-SCNC: 26 MMOL/L (ref 21–32)
CREAT SERPL-MCNC: 0.86 MG/DL (ref 0.6–1.3)
ERYTHROCYTE [DISTWIDTH] IN BLOOD BY AUTOMATED COUNT: 12.1 % (ref 11.6–15.1)
GFR SERPL CREATININE-BSD FRML MDRD: 93 ML/MIN/1.73SQ M
GLUCOSE P FAST SERPL-MCNC: 111 MG/DL (ref 65–99)
HCT VFR BLD AUTO: 48.8 % (ref 36.5–49.3)
HDLC SERPL-MCNC: 76 MG/DL
HGB BLD-MCNC: 16.5 G/DL (ref 12–17)
LDLC SERPL CALC-MCNC: 57 MG/DL (ref 0–100)
MCH RBC QN AUTO: 31.8 PG (ref 26.8–34.3)
MCHC RBC AUTO-ENTMCNC: 33.8 G/DL (ref 31.4–37.4)
MCV RBC AUTO: 94 FL (ref 82–98)
NONHDLC SERPL-MCNC: 78 MG/DL
PLATELET # BLD AUTO: 240 THOUSANDS/UL (ref 149–390)
PMV BLD AUTO: 9.6 FL (ref 8.9–12.7)
POTASSIUM SERPL-SCNC: 4.8 MMOL/L (ref 3.5–5.3)
PSA SERPL-MCNC: 0.88 NG/ML (ref 0–4)
RBC # BLD AUTO: 5.19 MILLION/UL (ref 3.88–5.62)
SODIUM SERPL-SCNC: 134 MMOL/L (ref 135–147)
TRIGL SERPL-MCNC: 103 MG/DL (ref ?–150)
WBC # BLD AUTO: 5.54 THOUSAND/UL (ref 4.31–10.16)

## 2025-06-04 PROCEDURE — 36415 COLL VENOUS BLD VENIPUNCTURE: CPT

## 2025-06-04 PROCEDURE — 85027 COMPLETE CBC AUTOMATED: CPT

## 2025-06-04 PROCEDURE — 80048 BASIC METABOLIC PNL TOTAL CA: CPT

## 2025-06-04 PROCEDURE — 80061 LIPID PANEL: CPT

## 2025-06-04 PROCEDURE — 84403 ASSAY OF TOTAL TESTOSTERONE: CPT

## 2025-06-04 PROCEDURE — G0103 PSA SCREENING: HCPCS

## 2025-06-04 PROCEDURE — 84402 ASSAY OF FREE TESTOSTERONE: CPT

## 2025-06-05 LAB
TESTOST FREE SERPL-MCNC: 12.7 PG/ML (ref 6.6–18.1)
TESTOST SERPL-MCNC: 653 NG/DL (ref 264–916)

## 2025-07-14 ENCOUNTER — TELEMEDICINE (OUTPATIENT)
Dept: UROLOGY | Facility: CLINIC | Age: 60
End: 2025-07-14
Payer: COMMERCIAL

## 2025-07-14 ENCOUNTER — TELEPHONE (OUTPATIENT)
Dept: UROLOGY | Facility: CLINIC | Age: 60
End: 2025-07-14

## 2025-07-14 DIAGNOSIS — E34.9 TESTOSTERONE DEFICIENCY: Primary | ICD-10-CM

## 2025-07-14 DIAGNOSIS — N40.0 BENIGN PROSTATIC HYPERPLASIA WITHOUT LOWER URINARY TRACT SYMPTOMS: ICD-10-CM

## 2025-07-14 PROCEDURE — 99213 OFFICE O/P EST LOW 20 MIN: CPT | Performed by: PHYSICIAN ASSISTANT

## 2025-07-14 NOTE — TELEPHONE ENCOUNTER
Spoke to pt after his virtual with [facility]. Check out instructions: Return in about 1 year (around 7/14/2026) for labs prior.     Pt states he is comfortable using [facility] for his lab work and said any tues/wed around 10am would be best for a virtual fu. Scheduled for 7/14/26 at 10:30am.